# Patient Record
Sex: MALE | Race: WHITE | NOT HISPANIC OR LATINO | Employment: FULL TIME | URBAN - NONMETROPOLITAN AREA
[De-identification: names, ages, dates, MRNs, and addresses within clinical notes are randomized per-mention and may not be internally consistent; named-entity substitution may affect disease eponyms.]

---

## 2024-04-09 ENCOUNTER — HOSPITAL ENCOUNTER (EMERGENCY)
Facility: HOSPITAL | Age: 54
Discharge: HOME | End: 2024-04-09
Attending: EMERGENCY MEDICINE
Payer: COMMERCIAL

## 2024-04-09 ENCOUNTER — APPOINTMENT (OUTPATIENT)
Dept: RADIOLOGY | Facility: HOSPITAL | Age: 54
End: 2024-04-09
Payer: COMMERCIAL

## 2024-04-09 VITALS
WEIGHT: 210 LBS | HEIGHT: 71 IN | HEART RATE: 72 BPM | DIASTOLIC BLOOD PRESSURE: 85 MMHG | SYSTOLIC BLOOD PRESSURE: 117 MMHG | BODY MASS INDEX: 29.4 KG/M2 | TEMPERATURE: 98.1 F | RESPIRATION RATE: 18 BRPM | OXYGEN SATURATION: 96 %

## 2024-04-09 DIAGNOSIS — S93.402A SPRAIN OF LEFT ANKLE, INITIAL ENCOUNTER: Primary | ICD-10-CM

## 2024-04-09 DIAGNOSIS — S83.91XA SPRAIN OF RIGHT LOWER LEG, INITIAL ENCOUNTER: ICD-10-CM

## 2024-04-09 PROCEDURE — 73610 X-RAY EXAM OF ANKLE: CPT | Mod: RIGHT SIDE | Performed by: RADIOLOGY

## 2024-04-09 PROCEDURE — 2500000004 HC RX 250 GENERAL PHARMACY W/ HCPCS (ALT 636 FOR OP/ED): Performed by: EMERGENCY MEDICINE

## 2024-04-09 PROCEDURE — 73590 X-RAY EXAM OF LOWER LEG: CPT | Mod: RIGHT SIDE | Performed by: RADIOLOGY

## 2024-04-09 PROCEDURE — 73610 X-RAY EXAM OF ANKLE: CPT | Mod: RT

## 2024-04-09 PROCEDURE — 96372 THER/PROPH/DIAG INJ SC/IM: CPT | Performed by: EMERGENCY MEDICINE

## 2024-04-09 PROCEDURE — 99284 EMERGENCY DEPT VISIT MOD MDM: CPT | Performed by: EMERGENCY MEDICINE

## 2024-04-09 PROCEDURE — 73590 X-RAY EXAM OF LOWER LEG: CPT | Mod: RT

## 2024-04-09 RX ORDER — KETOROLAC TROMETHAMINE 30 MG/ML
60 INJECTION, SOLUTION INTRAMUSCULAR; INTRAVENOUS ONCE
Status: COMPLETED | OUTPATIENT
Start: 2024-04-09 | End: 2024-04-09

## 2024-04-09 RX ORDER — IBUPROFEN 600 MG/1
600 TABLET ORAL 3 TIMES DAILY
Qty: 30 TABLET | Refills: 0 | Status: SHIPPED | OUTPATIENT
Start: 2024-04-09

## 2024-04-09 RX ADMIN — KETOROLAC TROMETHAMINE 60 MG: 30 INJECTION, SOLUTION INTRAMUSCULAR at 10:29

## 2024-04-09 ASSESSMENT — COLUMBIA-SUICIDE SEVERITY RATING SCALE - C-SSRS
6. HAVE YOU EVER DONE ANYTHING, STARTED TO DO ANYTHING, OR PREPARED TO DO ANYTHING TO END YOUR LIFE?: NO
2. HAVE YOU ACTUALLY HAD ANY THOUGHTS OF KILLING YOURSELF?: NO
1. IN THE PAST MONTH, HAVE YOU WISHED YOU WERE DEAD OR WISHED YOU COULD GO TO SLEEP AND NOT WAKE UP?: NO

## 2024-04-09 ASSESSMENT — ENCOUNTER SYMPTOMS
WOUND: 0
NUMBNESS: 0
VOMITING: 0
MYALGIAS: 1
WEAKNESS: 0
CHILLS: 0
FEVER: 0
NAUSEA: 0
ARTHRALGIAS: 1

## 2024-04-09 ASSESSMENT — PAIN DESCRIPTION - PROGRESSION: CLINICAL_PROGRESSION: NOT CHANGED

## 2024-04-09 ASSESSMENT — PAIN DESCRIPTION - LOCATION
LOCATION: ANKLE
LOCATION: ANKLE

## 2024-04-09 ASSESSMENT — PAIN - FUNCTIONAL ASSESSMENT
PAIN_FUNCTIONAL_ASSESSMENT: 0-10
PAIN_FUNCTIONAL_ASSESSMENT: 0-10

## 2024-04-09 ASSESSMENT — PAIN DESCRIPTION - PAIN TYPE
TYPE: ACUTE PAIN
TYPE: ACUTE PAIN

## 2024-04-09 ASSESSMENT — PAIN SCALES - GENERAL
PAINLEVEL_OUTOF10: 7
PAINLEVEL_OUTOF10: 5 - MODERATE PAIN
PAINLEVEL_OUTOF10: 5 - MODERATE PAIN

## 2024-04-09 ASSESSMENT — PAIN DESCRIPTION - ORIENTATION
ORIENTATION: RIGHT
ORIENTATION: RIGHT

## 2024-04-09 NOTE — ED PROVIDER NOTES
Chief Complaint: RIGHT ANKLE SPRAIN    53-year-old male who works for the [employer] and apparently stepped off the edge of a curb and twisted his ankle came down with all his weight on it yesterday and today complains of pain in the right ankle with pain rating up to the right lateral leg he noticed some swelling he took some ibuprofen with some minimal relief but complains of pain with ambulation at this time           Review of Systems   Constitutional:  Negative for chills and fever.   Gastrointestinal:  Negative for nausea and vomiting.   Musculoskeletal:  Positive for arthralgias and myalgias.   Skin:  Negative for wound.   Neurological:  Negative for weakness and numbness.   All other systems reviewed and are negative.       Physical Exam  Constitutional:       General: He is not in acute distress.     Appearance: Normal appearance. He is not ill-appearing.   Musculoskeletal:      Right ankle: Swelling present. Tenderness present.        Legs:    Skin:     General: Skin is warm and dry.      Capillary Refill: Capillary refill takes less than 2 seconds.      Findings: No rash.   Neurological:      General: No focal deficit present.      Mental Status: He is alert and oriented to person, place, and time.   Psychiatric:         Mood and Affect: Mood normal.          Labs Reviewed - No data to display     XR ankle right 3+ views   Final Result   No acute osseous abnormalities.   Signed by Gurpreet Roa MD      XR tibia fibula right 2 views    (Results Pending)        Procedures     Medical Decision Making  Tristanian diagnose include ankle sprain ankle fracture.  X-rays were ordered of the right ankle and tib-fib at this time.  X-rays of the right ankle were negative according to radiologist x-rays of tib-fib as interpreted by myself emergency physician were negative patient was given Aircast crutches and ibuprofen and follow-up with PCP Ortho or occupational health         Diagnoses as of 04/09/24 1126    Sprain of left ankle, initial encounter   Sprain of right lower leg, initial encounter                    Esau Romero MD  04/09/24 8562

## 2024-04-10 ENCOUNTER — OFFICE VISIT (OUTPATIENT)
Dept: ORTHOPEDIC SURGERY | Facility: CLINIC | Age: 54
End: 2024-04-10
Payer: COMMERCIAL

## 2024-04-10 DIAGNOSIS — S99.911S RIGHT ANKLE INJURY, SEQUELA: ICD-10-CM

## 2024-04-10 DIAGNOSIS — S93.401A MODERATE RIGHT ANKLE SPRAIN, INITIAL ENCOUNTER: Primary | ICD-10-CM

## 2024-04-10 PROCEDURE — 99204 OFFICE O/P NEW MOD 45 MIN: CPT | Performed by: NURSE PRACTITIONER

## 2024-04-10 RX ORDER — MINERAL OIL
180 ENEMA (ML) RECTAL
COMMUNITY
Start: 2024-02-12

## 2024-04-10 RX ORDER — ROSUVASTATIN CALCIUM 5 MG/1
5 TABLET, COATED ORAL DAILY
COMMUNITY

## 2024-04-10 RX ORDER — MONTELUKAST SODIUM 10 MG/1
10 TABLET ORAL DAILY
COMMUNITY

## 2024-04-10 RX ORDER — TADALAFIL 5 MG/1
5 TABLET ORAL DAILY
COMMUNITY

## 2024-04-10 RX ORDER — OMEPRAZOLE 40 MG/1
40 CAPSULE, DELAYED RELEASE ORAL DAILY
COMMUNITY

## 2024-04-10 RX ORDER — LISINOPRIL 10 MG/1
10 TABLET ORAL DAILY
COMMUNITY

## 2024-04-10 ASSESSMENT — ENCOUNTER SYMPTOMS
NEUROLOGICAL NEGATIVE: 1
RESPIRATORY NEGATIVE: 1
PSYCHIATRIC NEGATIVE: 1
ARTHRALGIAS: 1
ENDOCRINE NEGATIVE: 1
HEMATOLOGIC/LYMPHATIC NEGATIVE: 1
CARDIOVASCULAR NEGATIVE: 1
CONSTITUTIONAL NEGATIVE: 1

## 2024-04-10 ASSESSMENT — PAIN SCALES - GENERAL: PAINLEVEL_OUTOF10: 6

## 2024-04-10 NOTE — PROGRESS NOTES
Subjective    Patient ID: Vero Bruner is a 53 y.o. male.    Chief Complaint: Ankle Sprain of the Right Knee (DATE OF INJURY 04/08/2024)    [facility] DOI: 4/8/24    CHRISTIANA Pham is a pleasant 53-year-old gentleman presenting today for initial evaluation of right ankle sprain from 4/8/24.  Patient was walking sideways on a job site, rolled his ankle inward on an incline, he was able to brace himself to prevent falling with a rake.  No prior injuries.  Patient stated pain and swelling shortly after.  Was seen in ED and given an Aircast, crutches and Motrin.  This seems to help.  Patient has been resting, elevating and using ice and IcyHot.    Review of Systems   Constitutional: Negative.    HENT: Negative.     Respiratory: Negative.     Cardiovascular: Negative.    Endocrine: Negative.    Musculoskeletal:  Positive for arthralgias.   Skin: Negative.    Neurological: Negative.    Hematological: Negative.    Psychiatric/Behavioral: Negative.         Objective   Right Ankle Exam     Tenderness   The patient is experiencing tenderness in the lateral malleolus.  Swelling: mild    Range of Motion   Dorsiflexion:  20   Plantar flexion:  10   Eversion:  10   Inversion:  10     Tests   Anterior drawer: negative  Varus tilt: negative    Other   Erythema: absent  Sensation: normal  Pulse: present     Comments:  Mild swelling to lateral ankle, no discoloration at this time.  Patient is tender over the anterior mid to distal shin region, mild swelling appreciated.  Full range of motion of knee with no symptom aggravation.  Patient able to wiggle toes with distal motor or sensory intact, cap refill at 2 to 3 seconds.  No pain with metatarsal squeeze test.            Image Results:  XR tibia fibula right 2 views  Narrative: STUDY:  Tibia and Fibula Radiographs; 4/9/2024 at 9:21 AM.  INDICATION:  Injury with pain.  COMPARISON:  None available.  ACCESSION NUMBER(S):  [id number]  ORDERING CLINICIAN:  ESTELA HUNT  TECHNIQUE:  Two view(s) of the  right tibia and fibula (four images).  FINDINGS:    There is no displaced fracture.  The alignment is anatomic.  No soft  tissue abnormality is seen.  Impression: No acute osseous abnormality.  Signed by Azam Gray MD  XR ankle right 3+ views  Narrative: STUDY:  Ankle Radiographs;  4/9/2024 at 9:21 AM.  INDICATION:  Injury, pain.  COMPARISON:  None available.  ACCESSION NUMBER(S):  [id number]  ORDERING CLINICIAN:  ESTELA HUNT  TECHNIQUE:  Three view(s) of the right ankle.  FINDINGS:    There is no displaced fracture.  There is osteophytic spurring on the  plantar aspect calcaneus.  No soft tissue abnormality is seen.  Impression: No acute osseous abnormalities.  Signed by Gurpreet Roa MD    Reviewed ED note on date of visit.    Assessment/Plan   Encounter Diagnoses:

## 2024-04-11 NOTE — ASSESSMENT & PLAN NOTE
Sx control with previously prescribed ibuprofen per directions, take with food, Tylenol prn.   Rest, ice, elevate and compression/brace with weight bearing activity, off with rest and sleep.    Recommended topical anti-inflammatory cream or menthol based product to the shin and ankle region per package directions  Patient fitted in a tall walking boot with good fit and support with distal neurovascular intact., may remove with rest and sleep.  May perform gentle range of motion as tolerated out of the boot.  Recommend boot and partial weightbearing of 50% over the next 3 to days, he may advance as tolerated with weightbearing status  Activity restriction recommended: [employer] completed for seated work, unable to operate machinery and foot controls  Follow up here 2 weeks with repeat imaging, sooner for changes or concerns.    This note was generated using Dragon software.  It may contain errors in wording, punctuation or spelling.

## 2024-04-24 ENCOUNTER — HOSPITAL ENCOUNTER (OUTPATIENT)
Dept: RADIOLOGY | Facility: CLINIC | Age: 54
Discharge: HOME | End: 2024-04-24
Payer: COMMERCIAL

## 2024-04-24 ENCOUNTER — OFFICE VISIT (OUTPATIENT)
Dept: ORTHOPEDIC SURGERY | Facility: CLINIC | Age: 54
End: 2024-04-24
Payer: COMMERCIAL

## 2024-04-24 DIAGNOSIS — S99.911S RIGHT ANKLE INJURY, SEQUELA: ICD-10-CM

## 2024-04-24 DIAGNOSIS — S93.401A MODERATE RIGHT ANKLE SPRAIN, INITIAL ENCOUNTER: Primary | ICD-10-CM

## 2024-04-24 PROCEDURE — 99213 OFFICE O/P EST LOW 20 MIN: CPT | Performed by: NURSE PRACTITIONER

## 2024-04-24 PROCEDURE — 73610 X-RAY EXAM OF ANKLE: CPT | Mod: RIGHT SIDE | Performed by: STUDENT IN AN ORGANIZED HEALTH CARE EDUCATION/TRAINING PROGRAM

## 2024-04-24 PROCEDURE — 73610 X-RAY EXAM OF ANKLE: CPT | Mod: RT

## 2024-04-24 PROCEDURE — L1902 AFO ANKLE GAUNTLET PRE OTS: HCPCS | Performed by: NURSE PRACTITIONER

## 2024-04-24 ASSESSMENT — ENCOUNTER SYMPTOMS
CONSTITUTIONAL NEGATIVE: 1
ARTHRALGIAS: 1
ENDOCRINE NEGATIVE: 1
CARDIOVASCULAR NEGATIVE: 1
HEMATOLOGIC/LYMPHATIC NEGATIVE: 1
PSYCHIATRIC NEGATIVE: 1
RESPIRATORY NEGATIVE: 1
NEUROLOGICAL NEGATIVE: 1

## 2024-04-24 ASSESSMENT — PAIN SCALES - GENERAL: PAINLEVEL_OUTOF10: 4

## 2024-04-24 ASSESSMENT — PAIN DESCRIPTION - DESCRIPTORS: DESCRIPTORS: ACHING;DULL

## 2024-04-24 ASSESSMENT — PAIN - FUNCTIONAL ASSESSMENT: PAIN_FUNCTIONAL_ASSESSMENT: 0-10

## 2024-04-24 NOTE — PROGRESS NOTES
Subjective    Patient ID: Vero Bruner is a 53 y.o. male.    Chief Complaint: Ankle Sprain and Follow-up of the Right Ankle    [facility] DOI: 4/8/24    Right Ankle       Vero is a pleasant 53-year-old gentleman presenting today for FUV of right ankle sprain from 4/8/24.  Patient was walking sideways on a job site, rolled his ankle inward on an incline, he was able to brace himself to prevent falling with a rake.  No prior injuries.    Patient has been working within the [employer] restrictions, he is not driving or operating any heavy equipment.  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  He states he stopped wearing the walking boot after approximately 1 week and transition back to the air stirrup which he feels has good support.  Overall improvement is ranked at 30 to 40% improved since date of injury.  Symptoms are aggravated with having the brace on, inversion motion and any plant and twist motions at the ankle.  He is using ibuprofen and as needed basis with good symptom control.  Patient remains tender to the anterior lateral aspect of the ankle with mild swelling.  Also notices this area is achy in the mornings and improves with range of motion and light activity.  Patient has been resting, elevating and using ice and IcyHot.  Patient would like to return to full duty ASAP, job duties include frequent in and out of heavy equipment, operating foot controls, walking and climbing type activities and uneven ground navigation.  He is also required to wear safety work boots which she is unable to wear currently with the bracing.    Review of Systems   Constitutional: Negative.    HENT: Negative.     Respiratory: Negative.     Cardiovascular: Negative.    Endocrine: Negative.    Musculoskeletal:  Positive for arthralgias.   Skin: Negative.    Neurological: Negative.    Hematological: Negative.    Psychiatric/Behavioral: Negative.         Objective   Right Ankle Exam     Tenderness   The patient is  experiencing tenderness in the lateral malleolus.  Swelling: mild    Range of Motion   Dorsiflexion:  10   Plantar flexion:  30   Eversion:  10   Inversion:  10     Tests   Anterior drawer: negative  Varus tilt: negative    Other   Erythema: absent  Sensation: normal  Pulse: present     Comments:  Mild swelling to lateral ankle and improved since last visit, no discoloration at this time.  Mild tenderness and inflammation over the ATFL remains.  Swelling and discomfort to anterior mid to distal shin has resolved, full range of motion of knee with no symptom aggravation.  Patient able to wiggle toes with distal motor or sensory intact, cap refill at 2 to 3 seconds.  No pain with metatarsal squeeze test.             Image Results:  XR tibia fibula right 2 views  Narrative: STUDY:  Tibia and Fibula Radiographs; 4/9/2024 at 9:21 AM.  INDICATION:  Injury with pain.  COMPARISON:  None available.  ACCESSION NUMBER(S):  [id number]  ORDERING CLINICIAN:  ESTELA HUNT  TECHNIQUE:  Two view(s) of the right tibia and fibula (four images).  FINDINGS:    There is no displaced fracture.  The alignment is anatomic.  No soft  tissue abnormality is seen.  Impression: No acute osseous abnormality.  Signed by Azam Gray MD  XR ankle right 3+ views  Narrative: STUDY:  Ankle Radiographs;  4/9/2024 at 9:21 AM.  INDICATION:  Injury, pain.  COMPARISON:  None available.  ACCESSION NUMBER(S):  [id number]  ORDERING CLINICIAN:  ESTELA HUNT  TECHNIQUE:  Three view(s) of the right ankle.  FINDINGS:    There is no displaced fracture.  There is osteophytic spurring on the  plantar aspect calcaneus.  No soft tissue abnormality is seen.  Impression: No acute osseous abnormalities.  Signed by Gurpreet Roa MD    Independent review of ankle x-rays compared to prior imaging from 4/9/2024 on date of visit.  There is no acute changes, no evidence of fracture or misalignment within the joint.  Await radiology report.    Assessment/Plan   Encounter  Diagnoses:  Problem List Items Addressed This Visit             ICD-10-CM    Moderate right ankle sprain - Primary S93.401A     Sx control with previously prescribed ibuprofen per directions, take with food, Tylenol prn.   Rest, ice, elevate and ankle speed brace with weight bearing activity, off with rest and sleep.    Continue topical anti-inflammatory cream or menthol based product to the shin and ankle region as needed per package directions  Activity restriction recommended: [employer] updated for some increase in weightbearing activities, continue no operating foot pedals with right foot.  C9 requested for PT 2 times weekly x 6 weeks for ankle fast rehab, may wean out of brace as tolerated, assess work readiness  Follow up here 3 weeks, sooner for changes or concerns.    This note was generated using Dragon software.  It may contain errors in wording, punctuation or spelling.         Relevant Orders    Follow Up In Orthopaedic Surgery     Other Visit Diagnoses         Codes    Right ankle injury, sequela     S99.911S    Relevant Orders    Referral to Physical Therapy

## 2024-05-08 ENCOUNTER — EVALUATION (OUTPATIENT)
Dept: PHYSICAL THERAPY | Facility: CLINIC | Age: 54
End: 2024-05-08
Payer: COMMERCIAL

## 2024-05-08 DIAGNOSIS — S93.401D SPRAIN OF LIGAMENT OF RIGHT ANKLE, SUBSEQUENT ENCOUNTER: Primary | ICD-10-CM

## 2024-05-08 PROBLEM — S93.401A SPRAIN OF LIGAMENT OF RIGHT ANKLE: Status: ACTIVE | Noted: 2024-05-08

## 2024-05-08 PROCEDURE — 97110 THERAPEUTIC EXERCISES: CPT | Mod: GP

## 2024-05-08 PROCEDURE — 97161 PT EVAL LOW COMPLEX 20 MIN: CPT | Mod: GP

## 2024-05-08 ASSESSMENT — ENCOUNTER SYMPTOMS
LOSS OF SENSATION IN FEET: 0
DEPRESSION: 0
OCCASIONAL FEELINGS OF UNSTEADINESS: 0

## 2024-05-08 ASSESSMENT — PATIENT HEALTH QUESTIONNAIRE - PHQ9
1. LITTLE INTEREST OR PLEASURE IN DOING THINGS: NOT AT ALL
SUM OF ALL RESPONSES TO PHQ9 QUESTIONS 1 AND 2: 0
2. FEELING DOWN, DEPRESSED OR HOPELESS: NOT AT ALL

## 2024-05-08 ASSESSMENT — PAIN - FUNCTIONAL ASSESSMENT: PAIN_FUNCTIONAL_ASSESSMENT: 0-10

## 2024-05-08 ASSESSMENT — PAIN SCALES - GENERAL: PAINLEVEL_OUTOF10: 0 - NO PAIN

## 2024-05-08 NOTE — PROGRESS NOTES
Physical Therapy    Physical Therapy Evaluation and Treatment      Patient Name: Vero Bruner  MRN: [id number]  Today's Date: 5/8/2024  Time Calculation  Start Time: 0815  Stop Time: 0853  Time Calculation (min): 38 min  Low Complex Evaluation: 22 minutes  Therapeutic Exercise: 15 minutes  Assessment:    Vero Bruner, a 53 y.o. male, arrives to outpatient PT s/p R ankle sprain. Pt presents with the following impairments: R ankle pain, deficits in painfree R ankle AROM, restriction of surrounding R ankle musculature, deficits in R ankle and R hip musculature strength, R SLS instability on level and compliant surface, and deficits in functional mobility per LEFS score. These impairments contribute to difficulty in activity limitations and participation restrictions including standing, ambulation on level and uneven surfaces, transfers, and completion of household/job duties. The pt will benefit from skilled PT services 1-2x/week for 6 weeks to address the above stated impairments and functional limitations to maximize participation and ease in household and social related activities. The pt has a good prognosis when considering positive factors including age and PLOF with barriers such as increased risk of reinjury of ankle due to sprain. Educated in resistive strengthening exercises in seated position as well as ankle dorsiflexion stretch. Some pain present with active INV/EV with cues to complete in painfree ROM. Also tactile cueing to prevent LE compensation with ankle eversion. HEP handout provided. 0/10 pain at end of session. The pt verbalized understanding and agreement to goals and POC. Thank you for this referral and please call [phone] with any questions or concerns.    Plan:   OP PT Plan  Treatment/Interventions: Cryotherapy, Education/ Instruction, Gait training, Manual therapy, Neuromuscular re-education, Therapeutic activities, Taping techniques, Therapeutic exercises, Other (comment), Vasopneumatic  device (IASTM/cupping)  PT Plan: Skilled PT  PT Frequency:  (1-2x)  Duration: 6 weeks for 12 total visits in POC  Onset Date: 04/08/24  Number of Treatments Authorized: 12  Rehab Potential: Good  Plan of Care Agreement: Patient      Current Problem:   1. 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 Patient will feel pain with initial weightbearing in standing on the lateral portion of the ankle. He has been wearing an ankle brace which initially helped but now finds as he is feeling better it is more restrictive and leads to swelling and pain of the R hip as he cannot move the ankle to accommodate his leg. He is currently on restrictions at work. He ends up loosening the brace which gives minimal relief. He does ice it and takes pain medication as needed. Imaging did clear him of fracture of the R ankle.    Medical History Questionnaire reviewed with patient  No barriers to care or learning    Precautions:   Precautions  STEADI Fall Risk Score (The score of 4 or more indicates an increased risk of falling): 2  Precautions Comment: Hx of HTN    Pain:   Pain Assessment  Pain Score: 0 - No pain  Pain Location: Ankle  Pain Orientation: Right    Home Living:   No concerns for home set-up  Prior Level of Function:   Independent in all ADLs/iADLs with no restriction; Works for [employer]    Objective   OBJECTIVE:    Lower Extremity Strength:  MMT 5/5 max  RIGHT LEFT   Hip Flexion 4 5   Hip Extension 4 5   Hip Abduction 4 5   Hip Adduction 4 5   Knee Extension 5 5   Knee Flexion 5 5   Ankle DF 2-    Ankle PF 4    Ankle INV 4    Ankle EV 4      Lower Extremity ROM: WNL unless documented below:  ROM  in Degrees  RIGHT LEFT   Ankle DF -20 deg    Ankle  deg    Ankle INV 27 deg pain    Ankle EV 3 deg pain      R SLS: x10 min ankle reactions  R SLS on airex: x10 seconds with mod sway with ankle reactions  Gait: No significant gait deviations  Palpation: Restriction of R achilles tendon, gastroc, hamstring, dorsum of R ankle    Outcome Measures:  Other Measures  Lower Extremity Funtional Score (LEFS): 51     Treatments:  Low Complex Evaluation  Therapeutic Exercise  -Resistive R ankle DF/PF/EV/INV 2x10 with green band each direction  -Long sitting R gastroc stretch with strap 3x20 second  EDUCATION:   Access Code: [email or web address]  URL: [email or web address]  Date: 05/8/2024  Prepared by: Isabell Johnson    Exercises  - Long Sitting Calf Stretch with Strap  - 1 x daily - 7 x weekly - 1 sets - 3 reps - 20-30 second hold  - Long Sitting Ankle Plantar Flexion with Resistance  - 1 x daily - 7 x weekly - 1-2 sets - 10 reps  - Long Sitting Ankle Eversion with Resistance  - 1 x daily - 7 x weekly - 1-2 sets - 10 reps  - Long Sitting Ankle Inversion with Resistance  - 1 x daily - 7 x weekly - 1-2 sets - 10 reps  - Long Sitting Ankle Dorsiflexion with Anchored Resistance  - 1 x daily - 7 x weekly - 1-2 sets - 10 reps    Goals:  Increase in LEFS score by 10 points to demonstrate improved functional mobility of B ankles for ease with standing and walking.-Week 6  Patient will demonstrate R SLS on compliant surface x20 seconds min ankle reaction to increase stability with working on uneven surfaces to prevent injury-Week 6  Improved gross R ankle and hip musculature strength 5/5 MMT to increase stability/support with standing and ambulation at home, community, and work.-Week 6  Decrease in max pain of R ankle  0/10 or less to demonstrate improved QOL-Week 6  Improved R ankle AROM DF: 10 deg and EV: 10 deg painfree for improved ease with completing job duties in standing/weightbearing-Week 6  Patient will  demonstrate compliance in their home exercise program in order to promote independence in self management of functional mobility.-Week 2

## 2024-05-10 ENCOUNTER — TREATMENT (OUTPATIENT)
Dept: PHYSICAL THERAPY | Facility: CLINIC | Age: 54
End: 2024-05-10
Payer: COMMERCIAL

## 2024-05-10 DIAGNOSIS — S93.401D SPRAIN OF LIGAMENT OF RIGHT ANKLE, SUBSEQUENT ENCOUNTER: ICD-10-CM

## 2024-05-10 PROCEDURE — 97110 THERAPEUTIC EXERCISES: CPT | Mod: GP

## 2024-05-10 ASSESSMENT — PAIN - FUNCTIONAL ASSESSMENT: PAIN_FUNCTIONAL_ASSESSMENT: 0-10

## 2024-05-10 ASSESSMENT — PAIN SCALES - GENERAL: PAINLEVEL_OUTOF10: 0 - NO PAIN

## 2024-05-10 NOTE — PROGRESS NOTES
Physical Therapy    Physical Therapy Treatment    Patient Name: Vero Bruner  MRN: [id number]  Today's Date: 5/10/2024  Time Calculation  Start Time: 0830  Stop Time: 0909  Time Calculation (min): 39 min  Therapeutic Exercise: 38 minutes, 3 units    Assessment:   All exercises performed with brace doffed. No increase in ankle pain with completion but noted fatigue especially with completion of resistive ankle exercises as well as BAPS board. Increased tension of R achilles tendon. Increased R ankle sway when in SLS on compliant surface.    Plan: Progress with R ankle strength/stability to prevent injury with eventual return to work duties.  OP PT Plan  Treatment/Interventions: Cryotherapy, Education/ Instruction, Gait training, Manual therapy, Neuromuscular re-education, Therapeutic activities, Taping techniques, Therapeutic exercises, Other (comment), Vasopneumatic device (IASTM/cupping)  PT Plan: Skilled PT  PT Frequency:  (1-2x)  Duration: 6 weeks for 12 total visits in POC  Onset Date: 04/08/24  Number of Treatments Authorized: 12  Rehab Potential: Good  Plan of Care Agreement: Patient    Current Problem  1. Sprain of ligament of right ankle, subsequent encounter  Follow Up In Physical Therapy          General     General  Reason for Referral: R ankle injury  Referred By: Acosta IEVY  General Comment: POC:2/12  Patient reports no current pain of R ankle. Feels that his figure 8 strap is limiting of his ankle and causes compensation of his R knee. His pain decreases when he takes the brace out.    Subjective    Precautions  Precautions  STEADI Fall Risk Score (The score of 4 or more indicates an increased risk of falling): 2  Precautions Comment: Hx of HTN  Pain  Pain Assessment  Pain Assessment: 0-10  Pain Score: 0 - No pain  Pain Location: Ankle  Pain Orientation: Right    Objective     Treatment  Therapeutic Exercise  Review of brace use  Recumbent Bicycle LEVEL 1.0 x5 min N  Heel/toe raises in standing 2x10  level surface N  Slantboard gastroc stretch 2x1 min N  Step ups 6 in R LE ascending 2x10 N  R ankle SLS x20 seconds N  BAPS LEVEL 2.0 DF/PF/L/R/CW/CCW x10 each direction N  R ankle PROM INV/EV with blue strap x10 each direction N  -Resistive R ankle DF/PF/EV/INV 2x10 with green band each direction  -Long sitting R gastroc stretch with strap 3x20 second    OP EDUCATION:       Goals:  Active       PT Problem       PT Goals       Start:  05/10/24    Expected End:  06/21/24       Increase in LEFS score by 10 points to demonstrate improved functional mobility of B ankles for ease with standing and walking.-Week 6  Patient will demonstrate R SLS on compliant surface x20 seconds min ankle reaction to increase stability with working on uneven surfaces to prevent injury-Week 6  Improved gross R ankle and hip musculature strength 5/5 MMT to increase stability/support with standing and ambulation at home, community, and work.-Week 6  Decrease in max pain of R ankle  0/10 or less to demonstrate improved QOL-Week 6  Improved R ankle AROM DF: 10 deg and EV: 10 deg painfree for improved ease with completing job duties in standing/weightbearing-Week 6  Patient will demonstrate compliance in their home exercise program in order to promote independence in self management of functional mobility.-Week 2

## 2024-05-15 ENCOUNTER — OFFICE VISIT (OUTPATIENT)
Dept: ORTHOPEDIC SURGERY | Facility: CLINIC | Age: 54
End: 2024-05-15
Payer: COMMERCIAL

## 2024-05-15 ENCOUNTER — HOSPITAL ENCOUNTER (OUTPATIENT)
Dept: RADIOLOGY | Facility: CLINIC | Age: 54
Discharge: HOME | End: 2024-05-15
Payer: COMMERCIAL

## 2024-05-15 ENCOUNTER — TREATMENT (OUTPATIENT)
Dept: PHYSICAL THERAPY | Facility: CLINIC | Age: 54
End: 2024-05-15
Payer: COMMERCIAL

## 2024-05-15 DIAGNOSIS — S93.401A MODERATE RIGHT ANKLE SPRAIN, INITIAL ENCOUNTER: Primary | ICD-10-CM

## 2024-05-15 DIAGNOSIS — S99.911S RIGHT ANKLE INJURY, SEQUELA: ICD-10-CM

## 2024-05-15 DIAGNOSIS — S93.401D SPRAIN OF LIGAMENT OF RIGHT ANKLE, SUBSEQUENT ENCOUNTER: ICD-10-CM

## 2024-05-15 PROCEDURE — 97110 THERAPEUTIC EXERCISES: CPT | Mod: GP,CQ

## 2024-05-15 PROCEDURE — 99213 OFFICE O/P EST LOW 20 MIN: CPT | Performed by: NURSE PRACTITIONER

## 2024-05-15 PROCEDURE — 73610 X-RAY EXAM OF ANKLE: CPT | Mod: RIGHT SIDE | Performed by: STUDENT IN AN ORGANIZED HEALTH CARE EDUCATION/TRAINING PROGRAM

## 2024-05-15 PROCEDURE — 73610 X-RAY EXAM OF ANKLE: CPT | Mod: RT

## 2024-05-15 ASSESSMENT — PAIN - FUNCTIONAL ASSESSMENT
PAIN_FUNCTIONAL_ASSESSMENT: 0-10
PAIN_FUNCTIONAL_ASSESSMENT: 0-10

## 2024-05-15 ASSESSMENT — ENCOUNTER SYMPTOMS
HEMATOLOGIC/LYMPHATIC NEGATIVE: 1
ENDOCRINE NEGATIVE: 1
RESPIRATORY NEGATIVE: 1
CARDIOVASCULAR NEGATIVE: 1
ARTHRALGIAS: 1
PSYCHIATRIC NEGATIVE: 1
CONSTITUTIONAL NEGATIVE: 1
NEUROLOGICAL NEGATIVE: 1

## 2024-05-15 ASSESSMENT — PAIN SCALES - GENERAL
PAINLEVEL_OUTOF10: 4
PAINLEVEL_OUTOF10: 3

## 2024-05-15 ASSESSMENT — PAIN DESCRIPTION - DESCRIPTORS: DESCRIPTORS: TIGHTNESS;DISCOMFORT;ACHING

## 2024-05-15 NOTE — PROGRESS NOTES
Subjective    Patient ID: Vero Bruner is a 53 y.o. male.    Chief Complaint: Follow-up and Worker's Compensation of the Right Ankle    [facility] DOI: 4/8/24    Right Ankle       Vero is a pleasant 53-year-old gentleman presenting today for FUV of right ankle sprain from 4/8/24.  Patient was walking sideways on a job site, rolled his ankle inward on an incline, he was able to brace himself to prevent falling with a rake.  No prior injuries.      Patient has been working within the [employer] restrictions, he is not driving or operating any heavy equipment.  Tolerating some weightbearing activity on ground level surfaces with no difficulties.  Overall improvement this date is approximately 60%.  Patient has started in PT and had 3 sessions.  He does believe he is beginning to strengthen.  Patient has purchased an OTC compression sleeve with support in the ankle that he wears with good tolerance.  PT did reach out last week regarding some knee pain with use of the figure 8 brace.  Resolution of knee pain at this time with changing braces for support.  Patient still gets some soreness with certain motions and activities.  Patient has been resting, elevating and using ice and IcyHot.  No new injuries    Review of Systems   Constitutional: Negative.    HENT: Negative.     Respiratory: Negative.     Cardiovascular: Negative.    Endocrine: Negative.    Musculoskeletal:  Positive for arthralgias.   Skin: Negative.    Neurological: Negative.    Hematological: Negative.    Psychiatric/Behavioral: Negative.         Objective   Right Ankle Exam     Tenderness   The patient is experiencing tenderness in the lateral malleolus.  Swelling: mild (Improved)    Range of Motion   Dorsiflexion:  10   Plantar flexion:  40   Eversion:  20   Inversion:  30     Tests   Anterior drawer: negative  Varus tilt: negative    Other   Erythema: absent  Sensation: normal  Pulse: present     Comments:  Mild swelling remains lateral ankle and improved since  last visit, no discoloration at this time.  Mild tenderness and inflammation over the ATFL remains.  Full ROM of distal joints with no sx aggravation  with distal motor or sensory intact, cap refill at 2 to 3 seconds.  No pain with metatarsal squeeze test.  No ankle instability on exam.            Image Results:  XR ankle right 3+ views  Narrative: Interpreted By:  Jason Brady,   STUDY:  XR ANKLE RIGHT 3+ VIEWS; ;  4/24/2024 8:52 am      INDICATION:  Signs/Symptoms:POST INJURY.      COMPARISON:  04/09/2024      ACCESSION NUMBER(S):  [id number]      ORDERING CLINICIAN:  ARIANNE CROWLEY      FINDINGS:  Three views of the right ankle.      No acute fracture or dislocation. Plantar calcaneal enthesophyte. The  soft tissues are unremarkable.      Impression: No acute fracture or dislocation.      MACRO:  None      Signed by: Jason Brady 4/25/2024 5:44 PM  Dictation workstation:   [id number]    Independent review of ankle x-rays compared to prior imaging on date of visit.  There is no acute changes, no evidence of fracture or misalignment within the joint.  Await radiology report.  .  Reviewed most recent PT note on date of visit.  Sending message to PT to instruct on K taping technique.     Assessment/Plan   Encounter Diagnoses:  Problem List Items Addressed This Visit             ICD-10-CM    Moderate right ankle sprain - Primary S93.401A     Sx control with previously prescribed ibuprofen per directions, take with food, Tylenol prn.   Rest, ice, elevate and speed brace, compression brace or K taping with weight bearing activity, off with rest and sleep.    Continue topical anti-inflammatory cream or menthol based product to the shin and ankle region as needed per package directions  Activity restriction recommended: [employer] updated  increase in weightbearing activities as tolerated, continue no operating foot pedals with right foot.  C9 requested for PT 2 times weekly x 6 weeks for ankle fast rehab, may wean out  of brace as tolerated, assess work readiness  Follow up here 2 weeks, sooner for changes or concerns.  Anticipating increasing work ability depending on next exam.  This note was generated using Dragon software.  It may contain errors in wording, punctuation or spelling.          Other Visit Diagnoses         Codes    Right ankle injury, sequela     S99.911S    Relevant Orders    XR ankle right 3+ views

## 2024-05-15 NOTE — PROGRESS NOTES
Physical Therapy Treatment    Patient Name: Vero Bruner  MRN: [id number]  Today's Date: 5/15/2024  Time Calculation  Start Time: 0705  Stop Time: 0745  Time Calculation (min): 40 min  PT Therapeutic Procedures Time Entry  Therapeutic Exercise Time Entry: 38       Assessment:   Patient identified by name and date of birth. Patient demonstrated fair balance with SLS with intermittent finger touch down and minimal sway noted. All exercises preformed with use of soft brace this date.     OBJECTIVE    degrees   Plan:  OP PT Plan  Treatment/Interventions: Cryotherapy, Education/ Instruction, Gait training, Manual therapy, Neuromuscular re-education, Therapeutic activities, Taping techniques, Therapeutic exercises, Other (comment), Vasopneumatic device (IASTM/cupping)  PT Plan: Skilled PT  PT Frequency:  (1-2x)  Duration: 6 weeks for 12 total visits in POC  Onset Date: 04/08/24  Number of Treatments Authorized: 12  Rehab Potential: Good  Plan of Care Agreement: Patient  Continue with progression of ankle ROM and strengthening for increased stability and ease with ambulation for job duties.   Current Problem  Problem List Items Addressed This Visit             ICD-10-CM    Sprain of ligament of right ankle S93.401A       Subjective Patient reported compliance with % of the time and verbalized understanding.  Patient reported 6/10 achiness/discomfort/after treatment.  General  Reason for Referral: R ankle injury  Referred By: Acosta IVEY  General Comment: POC:3/12  Precautions  Precautions  Precautions Comment: Hx of HTN; can wean out of brace as tolerated    Pain  Pain Assessment: 0-10  Pain Score: 3  Pain Location: Ankle  Pain Orientation: Right     Treatments:  Therapeutic Exercise  Therapeutic Exercise Performed: Yes   Review of brace use  Recumbent Bicycle LEVEL 1.0 x5 min   Slantboard gastroc stretch 2x1 min   Heel/toe raises in standing 2x10 level surface   Step ups 6 in R LE ascending 2x10   R ankle SLS  "3x20 seconds   Tandem stance 2 x 30\" ea direction (N)  BAPS LEVEL 2.0 DF/PF/L/R/CW/CCW x20 each direction (P)  R ankle PROM INV/EV with blue strap x10 each direction    -Resistive R ankle DF/PF/EV/INV 2x10 with green band each direction  -Long sitting R gastroc stretch with strap 3x20 second    OP EDUCATION:   Access Code: [email or web address]  URL: [email or web address]  Date: 05/8/2024  Prepared by: Isabell Johnson     Exercises  - Long Sitting Calf Stretch with Strap  - 1 x daily - 7 x weekly - 1 sets - 3 reps - 20-30 second hold  - Long Sitting Ankle Plantar Flexion with Resistance  - 1 x daily - 7 x weekly - 1-2 sets - 10 reps  - Long Sitting Ankle Eversion with Resistance  - 1 x daily - 7 x weekly - 1-2 sets - 10 reps  - Long Sitting Ankle Inversion with Resistance  - 1 x daily - 7 x weekly - 1-2 sets - 10 reps  - Long Sitting Ankle Dorsiflexion with Anchored Resistance  - 1 x daily - 7 x weekly - 1-2 sets - 10 reps       Goals:  Active       PT Problem       PT Goals       Start:  05/10/24    Expected End:  06/21/24       Increase in LEFS score by 10 points to demonstrate improved functional mobility of B ankles for ease with standing and walking.-Week 6  Patient will demonstrate R SLS on compliant surface x20 seconds min ankle reaction to increase stability with working on uneven surfaces to prevent injury-Week 6  Improved gross R ankle and hip musculature strength 5/5 MMT to increase stability/support with standing and ambulation at home, community, and work.-Week 6  Decrease in max pain of R ankle  0/10 or less to demonstrate improved QOL-Week 6  Improved R ankle AROM DF: 10 deg and EV: 10 deg painfree for improved ease with completing job duties in standing/weightbearing-Week 6  Patient will demonstrate compliance in their home exercise program in order to promote independence in self management of functional mobility.-Week 2            "

## 2024-05-15 NOTE — ASSESSMENT & PLAN NOTE
Sx control with previously prescribed ibuprofen per directions, take with food, Tylenol prn.   Rest, ice, elevate and speed brace, compression brace or K taping with weight bearing activity, off with rest and sleep.    Continue topical anti-inflammatory cream or menthol based product to the shin and ankle region as needed per package directions  Activity restriction recommended: [employer] updated  increase in weightbearing activities as tolerated, continue no operating foot pedals with right foot.  C9 requested for PT 2 times weekly x 6 weeks for ankle fast rehab, may wean out of brace as tolerated, assess work readiness  Follow up here 2 weeks, sooner for changes or concerns.  Anticipating increasing work ability depending on next exam.  This note was generated using Dragon software.  It may contain errors in wording, punctuation or spelling.

## 2024-05-17 ENCOUNTER — TREATMENT (OUTPATIENT)
Dept: PHYSICAL THERAPY | Facility: CLINIC | Age: 54
End: 2024-05-17
Payer: COMMERCIAL

## 2024-05-17 DIAGNOSIS — S93.401D SPRAIN OF LIGAMENT OF RIGHT ANKLE, SUBSEQUENT ENCOUNTER: ICD-10-CM

## 2024-05-17 PROCEDURE — 97110 THERAPEUTIC EXERCISES: CPT | Mod: GP,CQ

## 2024-05-17 ASSESSMENT — PAIN SCALES - GENERAL: PAINLEVEL_OUTOF10: 3

## 2024-05-17 ASSESSMENT — PAIN - FUNCTIONAL ASSESSMENT: PAIN_FUNCTIONAL_ASSESSMENT: 0-10

## 2024-05-17 NOTE — PROGRESS NOTES
Physical Therapy Treatment    Patient Name: Vero Bruner  MRN: [id number]  Today's Date: 5/17/2024  Time Calculation  Start Time: 0830  Stop Time: 0917  Time Calculation (min): 47 min  PT Therapeutic Procedures Time Entry  Therapeutic Exercise Time Entry: 43       Assessment:   Patient appropriately challenged. Patient tolerates session with mild difficulty. Patient demo's slight increased symptoms after 4 minutes of R ankle traction in anterior portion of ankle joint. Patient able to tolerate progressions of resistance with no c/o increased symptoms. Demo's fatigue in R ankle pre and post session.     Plan:  Continue to work on improving strength and ROM in R ankle to be able to return to normal work duties with little to no difficulty. -AB.     OP PT Plan  Treatment/Interventions: Cryotherapy, Education/ Instruction, Gait training, Manual therapy, Neuromuscular re-education, Therapeutic activities, Taping techniques, Therapeutic exercises, Other (comment), Vasopneumatic device (IASTM/cupping)  PT Plan: Skilled PT  PT Frequency:  (1-2x)  Duration: 6 weeks for 12 total visits in POC  Onset Date: 04/08/24  Number of Treatments Authorized: 12  Rehab Potential: Good  Plan of Care Agreement: Patient    Current Problem  Problem List Items Addressed This Visit             ICD-10-CM    Sprain of ligament of right ankle S93.401D       Subjective   General  Reason for Referral: R ankle injury  Referred By: Acosta LILLY-TONJA  General Comment: POC:4/12  HEP: Yes  Patient states that he feels like it is getting better.     Precautions  Precautions  Precautions Comment: Hx of HTN; can wean out of brace as tolerated    Pain  Pain Assessment: 0-10  Pain Score: 3  Pain Location: Ankle  Pain Orientation: Right    Objective     Treatments:  Therapeutic Exercise: 43 minutes, 3 units   Review of brace use  Recumbent Bicycle LEVEL 1.0 x5 min   Slantboard gastroc stretch 2x1 min   Heel/toe raises in standing 2x10 level surface   Step ups 6 in  "R LE ascending 2x10 Fwd/Lateral (P, motion)   R ankle SLS 3x30 seconds (P, time)   Tandem stance 3 x 30\" ea direction (P, reps)   Ankle traction x5' PF/DF/CW/CCW R (N)  BAPS LEVEL 2.0 DF/PF/L/R/CW/CCW x20 each direction (X, not today)  R ankle PROM INV/EV with blue strap x10 each direction    -Resistive R ankle DF/PF/EV/INV 2x10 with Black band each direction (P, resistance)  -Long sitting R gastroc stretch with strap 3x20 second    OP EDUCATION:   Access Code: [email or web address]  URL: [email or web address]  Date: 05/8/2024  Prepared by: Isabell Johnson     Exercises  - Long Sitting Calf Stretch with Strap  - 1 x daily - 7 x weekly - 1 sets - 3 reps - 20-30 second hold  - Long Sitting Ankle Plantar Flexion with Resistance  - 1 x daily - 7 x weekly - 1-2 sets - 10 reps  - Long Sitting Ankle Eversion with Resistance  - 1 x daily - 7 x weekly - 1-2 sets - 10 reps  - Long Sitting Ankle Inversion with Resistance  - 1 x daily - 7 x weekly - 1-2 sets - 10 reps  - Long Sitting Ankle Dorsiflexion with Anchored Resistance  - 1 x daily - 7 x weekly - 1-2 sets - 10 reps    Goals:  Active       PT Problem       PT Goals       Start:  05/10/24    Expected End:  06/21/24       Increase in LEFS score by 10 points to demonstrate improved functional mobility of B ankles for ease with standing and walking.-Week 6  Patient will demonstrate R SLS on compliant surface x20 seconds min ankle reaction to increase stability with working on uneven surfaces to prevent injury-Week 6  Improved gross R ankle and hip musculature strength 5/5 MMT to increase stability/support with standing and ambulation at home, community, and work.-Week 6  Decrease in max pain of R ankle  0/10 or less to demonstrate improved QOL-Week 6  Improved R ankle AROM DF: 10 deg and EV: 10 deg painfree for improved ease with completing job duties in standing/weightbearing-Week 6  Patient will demonstrate compliance in their home exercise program in order to " promote independence in self management of functional mobility.-Week 2

## 2024-05-20 ENCOUNTER — TREATMENT (OUTPATIENT)
Dept: PHYSICAL THERAPY | Facility: CLINIC | Age: 54
End: 2024-05-20
Payer: COMMERCIAL

## 2024-05-20 DIAGNOSIS — S93.401D SPRAIN OF LIGAMENT OF RIGHT ANKLE, SUBSEQUENT ENCOUNTER: ICD-10-CM

## 2024-05-20 PROCEDURE — 97110 THERAPEUTIC EXERCISES: CPT | Mod: GP,CQ

## 2024-05-20 PROCEDURE — 97140 MANUAL THERAPY 1/> REGIONS: CPT | Mod: GP,CQ

## 2024-05-20 ASSESSMENT — PAIN - FUNCTIONAL ASSESSMENT: PAIN_FUNCTIONAL_ASSESSMENT: 0-10

## 2024-05-20 ASSESSMENT — PAIN SCALES - GENERAL: PAINLEVEL_OUTOF10: 4

## 2024-05-20 NOTE — PROGRESS NOTES
"Physical Therapy Treatment    Patient Name: Vero Bruner  MRN: [id number]  Today's Date: 5/20/2024  Time Calculation  Start Time: 0700  Stop Time: 0745  Time Calculation (min): 45 min  PT Therapeutic Procedures Time Entry  Manual Therapy Time Entry: 9  Therapeutic Exercise Time Entry: 34       Assessment:   Patient identified by name and date of birth. Patient was able to demonstrated good tolerance to treatment and progress with level of BAPS board this date. He presented with muscle tension/adhesions in plantar or foot.     OBJECTIVE   Patient was able to maintain balance with moderate sway for 20\"   Plan:  OP PT Plan  Treatment/Interventions: Cryotherapy, Education/ Instruction, Gait training, Manual therapy, Neuromuscular re-education, Therapeutic activities, Taping techniques, Therapeutic exercises, Other (comment), Vasopneumatic device (IASTM/cupping)  PT Plan: Skilled PT  PT Frequency:  (1-2x)  Duration: 6 weeks for 12 total visits in POC  Onset Date: 04/08/24  Number of Treatments Authorized: 12  Rehab Potential: Good  Plan of Care Agreement: Patient  Continue with progression of ankle strengthening and ROM  for increased ambulation.   Current Problem  Problem List Items Addressed This Visit             ICD-10-CM    Sprain of ligament of right ankle S93.401D       Subjective Patient reported compliance with % of the time and verbalized understanding.  Patient reported increased soreness after treatment that increased the next morning. He reported 3-4/10 pain after treatment.  General  Reason for Referral: R ankle injury  Referred By: Acosta IVEY  General Comment: POC:5/12  Precautions  Precautions  Precautions Comment: Hx of HTN; can wean out of brace as tolerated    Pain  Pain Assessment: 0-10  Pain Score: 4  Pain Location: Ankle  Pain Orientation: Right     Treatments:  Therapeutic Exercise  Therapeutic Exercise Performed: Yes   Review of brace use  Recumbent Bicycle LEVEL 1.0 x5 min   Slantboard " "gastroc stretch 2x1 min   Heel/toe raises in standing 2x10 level surface   Step ups 6 in R LE ascending 2x10 Fwd/Lateral (P, motion)   R ankle SLS 3x30 seconds (P, time)   Tandem stance 3 x 30\" ea direction (P, reps)   Ankle traction x5' PF/DF/CW/CCW R (N)  BAPS LEVEL 3.0 DF/PF/L/R/CW/CCW x20 each direction (P)  R ankle PROM INV/EV with blue strap x10 each direction    Resistive R ankle DF/PF/EV/INV 2x10 with Black band each direction (P, resistance)  Long sitting R gastroc stretch with strap 3x20 second    Manual;  PROM completed, STW to plantar of foot (N)     OP EDUCATION:   Access Code: [email or web address]  URL: [email or web address]  Date: 05/8/2024  Prepared by: Isabell oJhnson     Exercises  - Long Sitting Calf Stretch with Strap  - 1 x daily - 7 x weekly - 1 sets - 3 reps - 20-30 second hold  - Long Sitting Ankle Plantar Flexion with Resistance  - 1 x daily - 7 x weekly - 1-2 sets - 10 reps  - Long Sitting Ankle Eversion with Resistance  - 1 x daily - 7 x weekly - 1-2 sets - 10 reps  - Long Sitting Ankle Inversion with Resistance  - 1 x daily - 7 x weekly - 1-2 sets - 10 reps  - Long Sitting Ankle Dorsiflexion with Anchored Resistance  - 1 x daily - 7 x weekly - 1-2 sets - 10 reps  Goals:  Active       PT Problem       PT Goals       Start:  05/10/24    Expected End:  06/21/24       Increase in LEFS score by 10 points to demonstrate improved functional mobility of B ankles for ease with standing and walking.-Week 6  Patient will demonstrate R SLS on compliant surface x20 seconds min ankle reaction to increase stability with working on uneven surfaces to prevent injury-Week 6  Improved gross R ankle and hip musculature strength 5/5 MMT to increase stability/support with standing and ambulation at home, community, and work.-Week 6  Decrease in max pain of R ankle  0/10 or less to demonstrate improved QOL-Week 6  Improved R ankle AROM DF: 10 deg and EV: 10 deg painfree for improved ease with " completing job duties in standing/weightbearing-Week 6  Patient will demonstrate compliance in their home exercise program in order to promote independence in self management of functional mobility.-Week 2

## 2024-05-24 ENCOUNTER — TREATMENT (OUTPATIENT)
Dept: PHYSICAL THERAPY | Facility: CLINIC | Age: 54
End: 2024-05-24
Payer: COMMERCIAL

## 2024-05-24 DIAGNOSIS — S93.401D SPRAIN OF LIGAMENT OF RIGHT ANKLE, SUBSEQUENT ENCOUNTER: ICD-10-CM

## 2024-05-24 PROCEDURE — 97110 THERAPEUTIC EXERCISES: CPT | Mod: GP,CQ

## 2024-05-24 ASSESSMENT — PAIN SCALES - GENERAL: PAINLEVEL_OUTOF10: 0 - NO PAIN

## 2024-05-24 ASSESSMENT — PAIN - FUNCTIONAL ASSESSMENT: PAIN_FUNCTIONAL_ASSESSMENT: 0-10

## 2024-05-24 NOTE — PROGRESS NOTES
"Physical Therapy Treatment    Patient Name: Vero Bruner  MRN: [id number]  Today's Date: 5/24/2024  Time Calculation  Start Time: 0830  Stop Time: 0915  Time Calculation (min): 45 min  PT Therapeutic Procedures Time Entry  Therapeutic Exercise Time Entry: 43       Assessment:   Patient able to tolerate progressions of proprioception with intermittent fingertip touching noted. Patient tolerates PRE's with no c/o increased symptoms. Demo's fatigue at end of session.     Plan:  Continue to work on improving strength and ROM to be able to return to normal work duties with little to no challenges. -AB.     OP PT Plan  Treatment/Interventions: Cryotherapy, Education/ Instruction, Gait training, Manual therapy, Neuromuscular re-education, Therapeutic activities, Taping techniques, Therapeutic exercises, Other (comment), Vasopneumatic device (IASTM/cupping)  PT Plan: Skilled PT  PT Frequency:  (1-2x)  Duration: 6 weeks for 12 total visits in POC  Onset Date: 04/08/24  Number of Treatments Authorized: 12  Rehab Potential: Good  Plan of Care Agreement: Patient    Current Problem  Problem List Items Addressed This Visit             ICD-10-CM    Sprain of ligament of right ankle S93.401D       Subjective   General  Reason for Referral: R ankle injury  Referred By: Acosta LILLY-TONJA  General Comment: POC:6/12  HEP: Yes      Precautions  Precautions  Precautions Comment: Hx of HTN; can wean out of brace as tolerated    Pain  Pain Assessment: 0-10  Pain Score: 0 - No pain  Pain Location: Ankle  Pain Orientation: Right    Objective     Treatments:  Therapeutic Exercise: 43 minutes, 3 units  Review of brace use  Recumbent Bicycle LEVEL 1.0 x5 min   Slantboard gastroc stretch 2x1 min   Heel/toe raises in standing 2x10 level surface   Step ups 6 in R LE ascending 2x10 Fwd/Lateral (P, motion)   R ankle SLS 3x30 seconds on airex (P, surface)   Tandem stance 3 x 30\" ea direction on airex (P, surface)   Ankle traction x5' PF/DF/CW/CCW R (X) "   BAPS LEVEL 3.0 DF/PF/L/R/CW/CCW x20 each direction   R ankle PROM INV/EV with blue strap x10 each direction    Resistive R ankle DF/PF/EV/INV 2x10 with Black band each direction (P, resistance)  Long sitting R gastroc stretch with strap 3x20 second    Manual;  PROM completed, STW to plantar of foot (N)    OP EDUCATION:   Access Code: [email or web address]  URL: [email or web address]  Date: 05/8/2024  Prepared by: Isabell Johnson     Exercises  - Long Sitting Calf Stretch with Strap  - 1 x daily - 7 x weekly - 1 sets - 3 reps - 20-30 second hold  - Long Sitting Ankle Plantar Flexion with Resistance  - 1 x daily - 7 x weekly - 1-2 sets - 10 reps  - Long Sitting Ankle Eversion with Resistance  - 1 x daily - 7 x weekly - 1-2 sets - 10 reps  - Long Sitting Ankle Inversion with Resistance  - 1 x daily - 7 x weekly - 1-2 sets - 10 reps  - Long Sitting Ankle Dorsiflexion with Anchored Resistance  - 1 x daily - 7 x weekly - 1-2 sets - 10 reps    Goals:  Active       PT Problem       PT Goals       Start:  05/10/24    Expected End:  06/21/24       Increase in LEFS score by 10 points to demonstrate improved functional mobility of B ankles for ease with standing and walking.-Week 6  Patient will demonstrate R SLS on compliant surface x20 seconds min ankle reaction to increase stability with working on uneven surfaces to prevent injury-Week 6  Improved gross R ankle and hip musculature strength 5/5 MMT to increase stability/support with standing and ambulation at home, community, and work.-Week 6  Decrease in max pain of R ankle  0/10 or less to demonstrate improved QOL-Week 6  Improved R ankle AROM DF: 10 deg and EV: 10 deg painfree for improved ease with completing job duties in standing/weightbearing-Week 6  Patient will demonstrate compliance in their home exercise program in order to promote independence in self management of functional mobility.-Week 2

## 2024-05-29 ENCOUNTER — OFFICE VISIT (OUTPATIENT)
Dept: ORTHOPEDIC SURGERY | Facility: CLINIC | Age: 54
End: 2024-05-29
Payer: COMMERCIAL

## 2024-05-29 ENCOUNTER — TREATMENT (OUTPATIENT)
Dept: PHYSICAL THERAPY | Facility: CLINIC | Age: 54
End: 2024-05-29
Payer: COMMERCIAL

## 2024-05-29 DIAGNOSIS — S99.911S RIGHT ANKLE INJURY, SEQUELA: ICD-10-CM

## 2024-05-29 DIAGNOSIS — S93.401D SPRAIN OF LIGAMENT OF RIGHT ANKLE, SUBSEQUENT ENCOUNTER: ICD-10-CM

## 2024-05-29 DIAGNOSIS — S93.401A MODERATE RIGHT ANKLE SPRAIN, INITIAL ENCOUNTER: Primary | ICD-10-CM

## 2024-05-29 PROCEDURE — 97110 THERAPEUTIC EXERCISES: CPT | Mod: GP,CQ

## 2024-05-29 PROCEDURE — 99213 OFFICE O/P EST LOW 20 MIN: CPT | Performed by: NURSE PRACTITIONER

## 2024-05-29 ASSESSMENT — PAIN SCALES - GENERAL
PAINLEVEL_OUTOF10: 1
PAINLEVEL_OUTOF10: 3

## 2024-05-29 ASSESSMENT — ENCOUNTER SYMPTOMS
PSYCHIATRIC NEGATIVE: 1
ENDOCRINE NEGATIVE: 1
CARDIOVASCULAR NEGATIVE: 1
RESPIRATORY NEGATIVE: 1
NEUROLOGICAL NEGATIVE: 1
ARTHRALGIAS: 1
CONSTITUTIONAL NEGATIVE: 1
HEMATOLOGIC/LYMPHATIC NEGATIVE: 1

## 2024-05-29 ASSESSMENT — PAIN - FUNCTIONAL ASSESSMENT
PAIN_FUNCTIONAL_ASSESSMENT: 0-10
PAIN_FUNCTIONAL_ASSESSMENT: 0-10

## 2024-05-29 ASSESSMENT — PAIN DESCRIPTION - DESCRIPTORS: DESCRIPTORS: ACHING

## 2024-05-29 NOTE — ASSESSMENT & PLAN NOTE
Sx control with  ibuprofen per package directions on as needed basis, take with food, Tylenol prn.   Continue with compression sleeve on as needed basis  Continue with PT as scheduled  [employer] updated with return to work full duty as tolerated beginning next shift  Plan to follow-up here after completion of PT, sooner for changes or concerns  Patient in agreement with plan of care   Continue topical anti-inflammatory cream or menthol based product to the shin and ankle region as needed per package directions    This note was generated using Dragon software.  It may contain errors in wording, punctuation or spelling.

## 2024-05-29 NOTE — PROGRESS NOTES
Subjective    Patient ID: Vero Bruner is a 53 y.o. male.    Chief Complaint: Follow-up and Worker's Compensation of the Right Ankle (Patient states he wears the brace only while at work.  He states he has just occasional pain, very minimal)    [facility] DOI: 4/8/24    Right Ankle       Vero is a pleasant 53-year-old gentleman presenting today for FUV of right ankle sprain from 4/8/24.  Patient was walking sideways on a job site, rolled his ankle inward on an incline, he was able to brace himself to prevent falling with a rake.  No prior injuries.      Patient has been working within the [employer] restrictions, he is not driving or operating any heavy equipment.  Tolerating some weightbearing activity on ground level surfaces with no difficulties.  Overall improvement this date is approximately 60%.  Patient has started in PT and had 3 sessions.  He does believe he is beginning to strengthen.  Patient has purchased an OTC compression sleeve with support in the ankle that he wears with good tolerance.  PT did reach out last week regarding some knee pain with use of the figure 8 brace.  Resolution of knee pain at this time with changing braces for support.  Patient still gets some soreness with certain motions and activities.  Patient has been resting, elevating and using ice and IcyHot.  No new injuries    HEP at least once daily   PT - 4 sessions remaining  Feels ready to return full duty  [employer]   Someitmes sore in mornings, lossens with activity    Review of Systems   Constitutional: Negative.    HENT: Negative.     Respiratory: Negative.     Cardiovascular: Negative.    Endocrine: Negative.    Musculoskeletal:  Positive for arthralgias.   Skin: Negative.    Neurological: Negative.    Hematological: Negative.    Psychiatric/Behavioral: Negative.         Objective   Right Ankle Exam     Tenderness   The patient is experiencing tenderness in the lateral malleolus.  Swelling: mild (Improved)    Range of Motion    Dorsiflexion:  10   Plantar flexion:  40   Eversion:  20   Inversion:  30     Tests   Anterior drawer: negative  Varus tilt: negative    Other   Erythema: absent  Sensation: normal  Pulse: present     Comments:  Mild swelling remains lateral ankle and improved since last visit, no discoloration at this time.  Mild tenderness and inflammation over the ATFL remains.  Full ROM of distal joints with no sx aggravation  with distal motor or sensory intact, cap refill at 2 to 3 seconds.  No pain with metatarsal squeeze test.  No ankle instability on exam.            Image Results:  XR ankle right 3+ views  Narrative: Interpreted By:  Jason Brady,   STUDY:  XR ANKLE RIGHT 3+ VIEWS; ;  5/15/2024 8:34 am      INDICATION:  Signs/Symptoms:S/P INJURY, PAIN.      COMPARISON:  04/24/2024      ACCESSION NUMBER(S):  [id number]      ORDERING CLINICIAN:  ARIANNE CROWLEY      FINDINGS:  Three views of the right ankle.      No acute fracture. No dislocation. Mild medial malleolar spurring.  The soft tissues are unremarkable.      Impression: No acute fracture or dislocation.      MACRO:  None      Signed by: Jason Brady 5/17/2024 5:50 PM  Dictation workstation:   [id number]    Independent review of ankle x-rays compared to prior imaging on date of visit.  There is no acute changes, no evidence of fracture or misalignment within the joint.  Await radiology report.  .  Reviewed most recent PT note on date of visit, patient advancing well    Assessment/Plan   Encounter Diagnoses:  Problem List Items Addressed This Visit             ICD-10-CM    Moderate right ankle sprain S93.401A     Sx control with  ibuprofen per package directions on as needed basis, take with food, Tylenol prn.   Continue with compression sleeve on as needed basis  Continue with PT as scheduled  [employer] updated with return to work full duty as tolerated beginning next shift  Plan to follow-up here after completion of PT, sooner for changes or concerns  Patient  in agreement with plan of care   Continue topical anti-inflammatory cream or menthol based product to the shin and ankle region as needed per package directions    This note was generated using Dragon software.  It may contain errors in wording, punctuation or spelling.          Other Visit Diagnoses         Codes    Right ankle injury, sequela    -  Primary S99.911S

## 2024-05-29 NOTE — PROGRESS NOTES
Physical Therapy Treatment    Patient Name: Vero Bruner  MRN: [id number]  Today's Date: 5/29/2024  Time Calculation  Start Time: 0815  Stop Time: 0856  Time Calculation (min): 41 min  PT Therapeutic Procedures Time Entry  Therapeutic Exercise Time Entry: 39       Assessment:   Patient able to tolerate new PRE's with no c/o increased symptoms. Patient demo's challenges with standing balance exercises with work boots. Patient requires intermittent fingertip touching with standing balance board.     Plan:  Continue to work on improving strength and ROM to be able to perform normal work duties with little to no challenges. -AB.     OP PT Plan  Treatment/Interventions: Cryotherapy, Education/ Instruction, Gait training, Manual therapy, Neuromuscular re-education, Therapeutic activities, Taping techniques, Therapeutic exercises, Other (comment), Vasopneumatic device (IASTM/cupping)  PT Plan: Skilled PT  PT Frequency:  (1-2x)  Duration: 6 weeks for 12 total visits in POC  Onset Date: 04/08/24  Number of Treatments Authorized: 12  Rehab Potential: Good  Plan of Care Agreement: Patient    Current Problem  Problem List Items Addressed This Visit             ICD-10-CM    Sprain of ligament of right ankle S93.401D       Subjective   General  Reason for Referral: R ankle injury  Referred By: Acosta LILLY-TONJA  General Comment: POC:7/12  HEP: Yes  Patient states that he wore his boots all day yesterday at work and states that it felt a little different but states that he didn't have pain.     Precautions  Precautions  Precautions Comment: Hx of HTN; can wean out of brace as tolerated    Pain  Pain Assessment: 0-10  Pain Score: 3  Pain Location: Ankle  Pain Orientation: Right    Objective     Treatments:  Therapeutic Exercise: 39 minutes, 3 units  Review of brace use  Recumbent Bicycle LEVEL 1.0 x5 min   Slantboard gastroc stretch 2x1 min   Heel/toe raises in standing 2x10 level surface   Step ups 6 in R LE ascending 2x10  "Fwd/Lateral  R ankle SLS 3x30 seconds on airex  Tandem stance 3 x 30\" ea direction on airex   Ankle traction x5' PF/DF/CW/CCW R (X)   BAPS LEVEL 3.0 DF/PF/L/R/CW/CCW x20 each direction   R ankle PROM INV/EV with blue strap x10 each direction (X, not today)  Resistive R ankle DF/PF/EV/INV 2x10 with BOSU strap each direction (P, resistance)    Small balance board 2x1' (N)  Long sitting R gastroc stretch with strap 3x20 second    Manual;  PROM completed, STW to plantar of foot (X, not today)     OP EDUCATION:   Access Code: [email or web address]  URL: [email or web address]  Date: 05/8/2024  Prepared by: Isabell Johnson     Exercises  - Long Sitting Calf Stretch with Strap  - 1 x daily - 7 x weekly - 1 sets - 3 reps - 20-30 second hold  - Long Sitting Ankle Plantar Flexion with Resistance  - 1 x daily - 7 x weekly - 1-2 sets - 10 reps  - Long Sitting Ankle Eversion with Resistance  - 1 x daily - 7 x weekly - 1-2 sets - 10 reps  - Long Sitting Ankle Inversion with Resistance  - 1 x daily - 7 x weekly - 1-2 sets - 10 reps  - Long Sitting Ankle Dorsiflexion with Anchored Resistance  - 1 x daily - 7 x weekly - 1-2 sets - 10 reps    Goals:  Active       PT Problem       PT Goals       Start:  05/10/24    Expected End:  06/21/24       Increase in LEFS score by 10 points to demonstrate improved functional mobility of B ankles for ease with standing and walking.-Week 6  Patient will demonstrate R SLS on compliant surface x20 seconds min ankle reaction to increase stability with working on uneven surfaces to prevent injury-Week 6  Improved gross R ankle and hip musculature strength 5/5 MMT to increase stability/support with standing and ambulation at home, community, and work.-Week 6  Decrease in max pain of R ankle  0/10 or less to demonstrate improved QOL-Week 6  Improved R ankle AROM DF: 10 deg and EV: 10 deg painfree for improved ease with completing job duties in standing/weightbearing-Week 6  Patient will " demonstrate compliance in their home exercise program in order to promote independence in self management of functional mobility.-Week 2

## 2024-05-31 ENCOUNTER — TREATMENT (OUTPATIENT)
Dept: PHYSICAL THERAPY | Facility: CLINIC | Age: 54
End: 2024-05-31
Payer: COMMERCIAL

## 2024-05-31 DIAGNOSIS — S93.401D SPRAIN OF LIGAMENT OF RIGHT ANKLE, SUBSEQUENT ENCOUNTER: ICD-10-CM

## 2024-05-31 PROCEDURE — 97110 THERAPEUTIC EXERCISES: CPT | Mod: GP,CQ

## 2024-05-31 ASSESSMENT — PAIN - FUNCTIONAL ASSESSMENT: PAIN_FUNCTIONAL_ASSESSMENT: 0-10

## 2024-05-31 ASSESSMENT — PAIN SCALES - GENERAL: PAINLEVEL_OUTOF10: 0 - NO PAIN

## 2024-05-31 NOTE — PROGRESS NOTES
"Physical Therapy Treatment    Patient Name: Vero Bruner  MRN: [id number]  Today's Date: 5/31/2024  Time Calculation  Start Time: 0730  Stop Time: 0811  Time Calculation (min): 41 min  PT Therapeutic Procedures Time Entry  Therapeutic Exercise Time Entry: 39       Assessment:   Patient able to tolerate new PRE's with no c/o increased symptoms. Patient demo's good AROM in R ankle DF/PF. Demo's slight challenges with proprioception.     Plan:  Continue to work on improving strength and decreasing pain to be able to perform normal work duties with little to no challenges. -AB.     OP PT Plan  Treatment/Interventions: Cryotherapy, Education/ Instruction, Gait training, Manual therapy, Neuromuscular re-education, Therapeutic activities, Taping techniques, Therapeutic exercises, Other (comment), Vasopneumatic device (IASTM/cupping)  PT Plan: Skilled PT  PT Frequency:  (1-2x)  Duration: 6 weeks for 12 total visits in POC  Onset Date: 04/08/24  Number of Treatments Authorized: 12  Rehab Potential: Good  Plan of Care Agreement: Patient    Current Problem  Problem List Items Addressed This Visit             ICD-10-CM    Sprain of ligament of right ankle S93.401A       Subjective   General  Reason for Referral: R ankle injury  Referred By: Acosta LILLY-TONJA  General Comment: POC:8/12  HEP: Yes  Patient states that he's not having pain currently.     Precautions  Precautions  Precautions Comment: Hx of HTN; can wean out of brace as tolerated    Pain  Pain Assessment: 0-10  Pain Score: 0 - No pain  Pain Location: Ankle  Pain Orientation: Right    Objective     Treatments:  Therapeutic Exercise: 39 minutes, 3 units  Review of brace use  Recumbent Bicycle LEVEL 1.0 x5 min   Slantboard gastroc stretch 2x1 min   Heel/toe raises in standing 2x10 on airex (P, surface)   Step ups 6 in R LE ascending 2x10 Fwd/Lateral  R ankle SLS 3x30 seconds on airex  Tandem stance 3 x 30\" ea direction on airex   Standing Fwd lunges 2x10 on airex (N)  Ankle " traction x5' PF/DF/CW/CCW R (X)   BAPS LEVEL 3.0 DF/PF/L/R/CW/CCW x20 each direction   R ankle PROM INV/EV with blue strap x10 each direction (X, not today)  Resistive R ankle DF/PF/EV/INV 2x10 with BOSU strap each direction (P, resistance)    Small balance board 2x1'   Long sitting R gastroc stretch with strap 3x20 second (X)    Manual   PROM completed, STW to plantar of foot (X, not today)     OP EDUCATION:   Access Code: [email or web address]  URL: [email or web address]  Date: 05/8/2024  Prepared by: Isabell Johnson     Exercises  - Long Sitting Calf Stretch with Strap  - 1 x daily - 7 x weekly - 1 sets - 3 reps - 20-30 second hold  - Long Sitting Ankle Plantar Flexion with Resistance  - 1 x daily - 7 x weekly - 1-2 sets - 10 reps  - Long Sitting Ankle Eversion with Resistance  - 1 x daily - 7 x weekly - 1-2 sets - 10 reps  - Long Sitting Ankle Inversion with Resistance  - 1 x daily - 7 x weekly - 1-2 sets - 10 reps  - Long Sitting Ankle Dorsiflexion with Anchored Resistance  - 1 x daily - 7 x weekly - 1-2 sets - 10 reps    Goals:  Active       PT Problem       PT Goals       Start:  05/10/24    Expected End:  06/21/24       Increase in LEFS score by 10 points to demonstrate improved functional mobility of B ankles for ease with standing and walking.-Week 6  Patient will demonstrate R SLS on compliant surface x20 seconds min ankle reaction to increase stability with working on uneven surfaces to prevent injury-Week 6  Improved gross R ankle and hip musculature strength 5/5 MMT to increase stability/support with standing and ambulation at home, community, and work.-Week 6  Decrease in max pain of R ankle  0/10 or less to demonstrate improved QOL-Week 6  Improved R ankle AROM DF: 10 deg and EV: 10 deg painfree for improved ease with completing job duties in standing/weightbearing-Week 6  Patient will demonstrate compliance in their home exercise program in order to promote independence in self  management of functional mobility.-Week 2

## 2024-06-03 ENCOUNTER — TREATMENT (OUTPATIENT)
Dept: PHYSICAL THERAPY | Facility: CLINIC | Age: 54
End: 2024-06-03
Payer: COMMERCIAL

## 2024-06-03 DIAGNOSIS — S93.401D SPRAIN OF LIGAMENT OF RIGHT ANKLE, SUBSEQUENT ENCOUNTER: ICD-10-CM

## 2024-06-03 PROCEDURE — 97110 THERAPEUTIC EXERCISES: CPT | Mod: GP,CQ

## 2024-06-03 ASSESSMENT — PAIN - FUNCTIONAL ASSESSMENT: PAIN_FUNCTIONAL_ASSESSMENT: 0-10

## 2024-06-03 ASSESSMENT — PAIN SCALES - GENERAL: PAINLEVEL_OUTOF10: 0 - NO PAIN

## 2024-06-03 NOTE — PROGRESS NOTES
"Physical Therapy Treatment    Patient Name: Vero Bruner  MRN: [id number]  Today's Date: 6/3/2024  Time Calculation  Start Time: 0702  Stop Time: 0742  Time Calculation (min): 40 min      Assessment:   Good tolerance and response to treatment with no c/o pain, only mild fatigue, afterwards. Challenged with balance activities but no LOB -> patient able to self-correct. Good ROM observed in right ankle.    Plan:  Continue to work on improving strength and decreasing pain to be able to perform normal work duties with little to no challenges.     OP PT Plan  Treatment/Interventions: Cryotherapy, Education/ Instruction, Gait training, Manual therapy, Neuromuscular re-education, Therapeutic activities, Taping techniques, Therapeutic exercises, Other (comment), Vasopneumatic device (IASTM/cupping)  PT Plan: Skilled PT  PT Frequency:  (1-2x)  Duration: 6 weeks for 12 total visits in POC  Onset Date: 04/08/24  Number of Treatments Authorized: 12  Rehab Potential: Good  Plan of Care Agreement: Patient    Current Problem  Problem List Items Addressed This Visit             ICD-10-CM    Sprain of ligament of right ankle S93.401A       Subjective   Patient stated his ankle has improved over the past wk or 2. Still get some tightness once in a while but no pain today.     Precautions  Precautions  Precautions Comment: Hx of HTN; can wean out of brace as tolerated    Pain  Pain Assessment: 0-10  Pain Score: 0 - No pain  Pain Location: Ankle  Pain Orientation: Right    Objective:  Therapeutic Exercise: 39 minutes, 3 units  Review of brace use  Recumbent Bicycle LEVEL 1.0 x5 min   Slantboard gastroc stretch 2x1 min   Heel/toe raises in standing 2x10 on airex    Step ups 6 in R LE ascending 2x10 Fwd/Lateral  R ankle SLS 3x30 seconds on airex  Tandem stance 3 x 30\" ea direction on airex   Standing Fwd lunges 2x10 on BOSU  [P]  Ankle traction x5' PF/DF/CW/CCW R (X)   BAPS LEVEL 3.0 DF/PF/L/R/CW/CCW x20 each direction   R ankle PROM INV/EV " with blue strap x10 each direction (X, not today)  Resistive R ankle DF/PF/EV/INV 2x10 with BOSU strap each direction (P, resistance)    Small balance board 2x1'   Long sitting R gastroc stretch with strap 3x20 second (X)     Manual   PROM completed, STW to plantar of foot (X, not today)     OP EDUCATION:   Access Code: [email or web address]  URL: [email or web address]  Date: 05/8/2024  Prepared by: Isabell Johnson     Exercises  - Long Sitting Calf Stretch with Strap  - 1 x daily - 7 x weekly - 1 sets - 3 reps - 20-30 second hold  - Long Sitting Ankle Plantar Flexion with Resistance  - 1 x daily - 7 x weekly - 1-2 sets - 10 reps  - Long Sitting Ankle Eversion with Resistance  - 1 x daily - 7 x weekly - 1-2 sets - 10 reps  - Long Sitting Ankle Inversion with Resistance  - 1 x daily - 7 x weekly - 1-2 sets - 10 reps  - Long Sitting Ankle Dorsiflexion with Anchored Resistance  - 1 x daily - 7 x weekly - 1-2 sets - 10 reps       Goals:  Active       PT Problem       PT Goals       Start:  05/10/24    Expected End:  06/21/24       Increase in LEFS score by 10 points to demonstrate improved functional mobility of B ankles for ease with standing and walking.-Week 6  Patient will demonstrate R SLS on compliant surface x20 seconds min ankle reaction to increase stability with working on uneven surfaces to prevent injury-Week 6  Improved gross R ankle and hip musculature strength 5/5 MMT to increase stability/support with standing and ambulation at home, community, and work.-Week 6  Decrease in max pain of R ankle  0/10 or less to demonstrate improved QOL-Week 6  Improved R ankle AROM DF: 10 deg and EV: 10 deg painfree for improved ease with completing job duties in standing/weightbearing-Week 6  Patient will demonstrate compliance in their home exercise program in order to promote independence in self management of functional mobility.-Week 2

## 2024-06-06 ENCOUNTER — TREATMENT (OUTPATIENT)
Dept: PHYSICAL THERAPY | Facility: CLINIC | Age: 54
End: 2024-06-06
Payer: COMMERCIAL

## 2024-06-06 DIAGNOSIS — S93.401D SPRAIN OF LIGAMENT OF RIGHT ANKLE, SUBSEQUENT ENCOUNTER: ICD-10-CM

## 2024-06-06 PROCEDURE — 97110 THERAPEUTIC EXERCISES: CPT | Mod: GP,CQ

## 2024-06-06 ASSESSMENT — PAIN SCALES - GENERAL: PAINLEVEL_OUTOF10: 0 - NO PAIN

## 2024-06-06 ASSESSMENT — PAIN - FUNCTIONAL ASSESSMENT: PAIN_FUNCTIONAL_ASSESSMENT: 0-10

## 2024-06-06 NOTE — PROGRESS NOTES
"Physical Therapy Treatment    Patient Name: Vero Bruner  MRN: [id number]  Today's Date: 2024  Time Calculation  Start Time: 702  Stop Time: 745  Time Calculation (min): 43 min      Assessment:   Patient identified with name and .   Continued therex for strength and balance/proprioception with good response and tolerance. He denies pain during treatment, only mild fatigue afterwards.     Plan:  Continue to work on improving strength and decreasing pain to be able to perform normal work duties with little to no challenges.   OP PT Plan  Treatment/Interventions: Cryotherapy, Education/ Instruction, Gait training, Manual therapy, Neuromuscular re-education, Therapeutic activities, Taping techniques, Therapeutic exercises, Other (comment), Vasopneumatic device (IASTM/cupping)  PT Plan: Skilled PT  PT Frequency:  (1-2x)  Duration: 6 weeks for 12 total visits in POC  Onset Date: 24  Number of Treatments Authorized: 12  Rehab Potential: Good  Plan of Care Agreement: Patient    Current Problem  Problem List Items Addressed This Visit             ICD-10-CM    Sprain of ligament of right ankle S93.401A       Subjective   Still some weakness in the right ankle once in a while but no pain.     Precautions  Precautions  Precautions Comment: Hx of HTN; can wean out of brace as tolerated    Pain  Pain Assessment: 0-10  Pain Score: 0 - No pain  Pain Location: Ankle  Pain Orientation: Right    Objective:  Therapeutic Exercise: 39 minutes, 3 units  Review of brace use  Recumbent Bicycle LEVEL 1.0 x5 min   Slantboard gastroc stretch 2x1 min   Heel/toe raises in standing 2x10 on airex    Step ups 6 in R LE ascending 2x10 Fwd/Lateral  R ankle SLS 3x30 seconds on airex  Tandem stance 3 x 30\" ea direction on airex   Standing Fwd lunges 2x10 on BOSU  [P]  Ankle traction x5' PF/DF/CW/CCW R (X)   BAPS LEVEL 3.0 DF/PF/L/R/CW/CCW x20 each direction   R ankle PROM INV/EV with blue strap x10 each direction (X, not today)  Resistive R " ankle DF/PF/EV/INV 2x10 with BOSU strap each direction (P, resistance)    Small balance board 2x1'   Long sitting R gastroc stretch with strap 3x20 second (X)     Manual   PROM completed, STW to plantar of foot (X, not today)     OP EDUCATION:    Access Code: [email or web address]  URL: [email or web address]  Date: 05/8/2024  Prepared by: Isabell Johnson     Exercises  - Long Sitting Calf Stretch with Strap  - 1 x daily - 7 x weekly - 1 sets - 3 reps - 20-30 second hold  - Long Sitting Ankle Plantar Flexion with Resistance  - 1 x daily - 7 x weekly - 1-2 sets - 10 reps  - Long Sitting Ankle Eversion with Resistance  - 1 x daily - 7 x weekly - 1-2 sets - 10 reps  - Long Sitting Ankle Inversion with Resistance  - 1 x daily - 7 x weekly - 1-2 sets - 10 reps  - Long Sitting Ankle Dorsiflexion with Anchored Resistance  - 1 x daily - 7 x weekly - 1-2 sets - 10 reps          Goals:  Active       PT Problem       PT Goals       Start:  05/10/24    Expected End:  06/21/24       Increase in LEFS score by 10 points to demonstrate improved functional mobility of B ankles for ease with standing and walking.-Week 6  Patient will demonstrate R SLS on compliant surface x20 seconds min ankle reaction to increase stability with working on uneven surfaces to prevent injury-Week 6  Improved gross R ankle and hip musculature strength 5/5 MMT to increase stability/support with standing and ambulation at home, community, and work.-Week 6  Decrease in max pain of R ankle  0/10 or less to demonstrate improved QOL-Week 6  Improved R ankle AROM DF: 10 deg and EV: 10 deg painfree for improved ease with completing job duties in standing/weightbearing-Week 6  Patient will demonstrate compliance in their home exercise program in order to promote independence in self management of functional mobility.-Week 2

## 2024-06-10 ENCOUNTER — TREATMENT (OUTPATIENT)
Dept: PHYSICAL THERAPY | Facility: CLINIC | Age: 54
End: 2024-06-10
Payer: COMMERCIAL

## 2024-06-10 DIAGNOSIS — S93.401D SPRAIN OF LIGAMENT OF RIGHT ANKLE, SUBSEQUENT ENCOUNTER: ICD-10-CM

## 2024-06-10 PROCEDURE — 97110 THERAPEUTIC EXERCISES: CPT | Mod: GP,CQ

## 2024-06-10 ASSESSMENT — PAIN SCALES - GENERAL: PAINLEVEL_OUTOF10: 0 - NO PAIN

## 2024-06-10 ASSESSMENT — PAIN - FUNCTIONAL ASSESSMENT: PAIN_FUNCTIONAL_ASSESSMENT: 0-10

## 2024-06-10 NOTE — PROGRESS NOTES
"Physical Therapy Treatment    Patient Name: Vero Bruner  MRN: [id number]  Today's Date: 6/10/2024  Time Calculation  Start Time: 703  Stop Time: 745  Time Calculation (min): 42 min      Assessment:   Patient identified with name and .   HEP reviewed = good patient understanding. Better stability observed during closed-chain tasks and patient ambulates without deviation. He'll meet with evaluating PT next session for reassessment.     Plan:  Continue to work on improving strength and decreasing pain to be able to perform normal work duties with little to no challenges.     OP PT Plan  Treatment/Interventions: Cryotherapy, Education/ Instruction, Gait training, Manual therapy, Neuromuscular re-education, Therapeutic activities, Taping techniques, Therapeutic exercises, Other (comment), Vasopneumatic device (IASTM/cupping)  PT Plan: Skilled PT  PT Frequency:  (1-2x)  Duration: 6 weeks for 12 total visits in POC  Onset Date: 24  Number of Treatments Authorized: 12  Rehab Potential: Good  Plan of Care Agreement: Patient    Current Problem  Problem List Items Addressed This Visit             ICD-10-CM    Sprain of ligament of right ankle S93.401A       Subjective   Patient stated he's doing well. Ankle is feeling stronger and no pain.     Precautions  Precautions  Precautions Comment: Hx of HTN; can wean out of brace as tolerated    Pain  Pain Assessment: 0-10  Pain Score: 0 - No pain  Pain Location: Ankle  Pain Orientation: Right    Objective:  Therapeutic Exercise: 39 minutes, 3 units  Review of brace use  Recumbent Bicycle LEVEL 1.0 x6 min   Slantboard gastroc stretch 2x1 min   Heel/toe raises in standing 2x10 on airex    Step ups 6 in R LE ascending 2x10 Fwd/Lateral  R ankle SLS 3x30 seconds on airex  Tandem stance 3 x 30\" ea direction on airex   Standing Fwd lunges 2x10 on BOSU    Ankle traction x5' PF/DF/CW/CCW R (X)   BAPS LEVEL 3.0 DF/PF/L/R/CW/CCW x20 each direction   R ankle PROM INV/EV with blue strap " x10 each direction (X, not today)  Resistive R ankle DF/PF/EV/INV 2x10 with BOSU strap each direction (P, resistance)    Small balance board 2x1'   Long sitting R gastroc stretch with strap 3x20 second (X)     Manual   PROM completed, STW to plantar of foot (X, not today)        OP EDUCATION:     Access Code: [email or web address]  URL: [email or web address]  Date: 05/8/2024  Prepared by: Isabell Johnson     Exercises  - Long Sitting Calf Stretch with Strap  - 1 x daily - 7 x weekly - 1 sets - 3 reps - 20-30 second hold  - Long Sitting Ankle Plantar Flexion with Resistance  - 1 x daily - 7 x weekly - 1-2 sets - 10 reps  - Long Sitting Ankle Eversion with Resistance  - 1 x daily - 7 x weekly - 1-2 sets - 10 reps  - Long Sitting Ankle Inversion with Resistance  - 1 x daily - 7 x weekly - 1-2 sets - 10 reps  - Long Sitting Ankle Dorsiflexion with Anchored Resistance  - 1 x daily - 7 x weekly - 1-2 sets - 10 reps    Goals:  Active       PT Problem       PT Goals       Start:  05/10/24    Expected End:  06/21/24       Increase in LEFS score by 10 points to demonstrate improved functional mobility of B ankles for ease with standing and walking.-Week 6  Patient will demonstrate R SLS on compliant surface x20 seconds min ankle reaction to increase stability with working on uneven surfaces to prevent injury-Week 6  Improved gross R ankle and hip musculature strength 5/5 MMT to increase stability/support with standing and ambulation at home, community, and work.-Week 6  Decrease in max pain of R ankle  0/10 or less to demonstrate improved QOL-Week 6  Improved R ankle AROM DF: 10 deg and EV: 10 deg painfree for improved ease with completing job duties in standing/weightbearing-Week 6  Patient will demonstrate compliance in their home exercise program in order to promote independence in self management of functional mobility.-Week 2

## 2024-06-14 ENCOUNTER — APPOINTMENT (OUTPATIENT)
Dept: PHYSICAL THERAPY | Facility: CLINIC | Age: 54
End: 2024-06-14
Payer: COMMERCIAL

## 2024-06-17 ENCOUNTER — DOCUMENTATION (OUTPATIENT)
Dept: PHYSICAL THERAPY | Facility: CLINIC | Age: 54
End: 2024-06-17
Payer: COMMERCIAL

## 2024-06-17 NOTE — PROGRESS NOTES
Physical Therapy                 Therapy Communication Note    Patient Name: Vero Bruner  MRN: [id number]  Today's Date: 6/17/2024     Discipline: Physical Therapy    Missed Time: No Show    Comment: Patient NS/NC treatment session.

## 2024-07-01 ENCOUNTER — APPOINTMENT (OUTPATIENT)
Dept: ORTHOPEDIC SURGERY | Facility: CLINIC | Age: 54
End: 2024-07-01
Payer: COMMERCIAL

## 2024-07-01 DIAGNOSIS — S93.401A MODERATE RIGHT ANKLE SPRAIN, INITIAL ENCOUNTER: Primary | ICD-10-CM

## 2024-07-01 PROCEDURE — 99213 OFFICE O/P EST LOW 20 MIN: CPT | Performed by: NURSE PRACTITIONER

## 2024-07-01 ASSESSMENT — ENCOUNTER SYMPTOMS
ARTHRALGIAS: 0
ENDOCRINE NEGATIVE: 1
NEUROLOGICAL NEGATIVE: 1
CARDIOVASCULAR NEGATIVE: 1
PSYCHIATRIC NEGATIVE: 1
RESPIRATORY NEGATIVE: 1
HEMATOLOGIC/LYMPHATIC NEGATIVE: 1
CONSTITUTIONAL NEGATIVE: 1

## 2024-07-01 ASSESSMENT — PAIN - FUNCTIONAL ASSESSMENT: PAIN_FUNCTIONAL_ASSESSMENT: NO/DENIES PAIN

## 2024-07-01 NOTE — ASSESSMENT & PLAN NOTE
Continue with home exercises as instructed, activity as tolerated  Discussed being aware of aggravation of symptoms to allow for rest, brace as tolerated  [employer] updated, continue full duty work, no restrictions, follow-up as needed for any concerns or symptom aggravation  Patient in agreement with plan of care   This note was generated using Dragon software.  It may contain errors in wording, punctuation or spelling.

## 2024-07-01 NOTE — PROGRESS NOTES
Subjective    Patient ID: Vero Bruner is a 53 y.o. male.    Chief Complaint: Follow-up and Worker's Compensation of the Right Ankle (Patient states he does not need to wear brace anymore.  He has completed  physical therapy.)    [facility] DOI: 4/8/24    Right Ankle       Vero is a pleasant 53-year-old gentleman presenting today for FUV of right ankle sprain from 4/8/24.  Patient was walking sideways on a job site, rolled his ankle inward on an incline, he was able to brace himself to prevent falling with a rake.  No prior injuries.    Recently completed PT and feels total resolution of symptoms.  No aggravating factors identified.  Patient has weaned out of of stabilizing brace and is tolerating full activity with no difficulties.  Continues with home exercises several times daily.  Has been tolerating full duty work well with no  No new injuries    Review of Systems   Constitutional: Negative.    HENT: Negative.     Respiratory: Negative.     Cardiovascular: Negative.    Endocrine: Negative.    Musculoskeletal:  Negative for arthralgias.   Skin: Negative.    Neurological: Negative.    Hematological: Negative.    Psychiatric/Behavioral: Negative.         Objective   Right Ankle Exam   Right ankle exam is normal.    Tenderness   Right ankle tenderness location: Resolved.  Right ankle swelling: Improved.    Range of Motion   Dorsiflexion:  20   Plantar flexion:  50   Eversion:  30   Inversion:  50     Tests   Anterior drawer: negative  Varus tilt: negative    Other   Erythema: absent  Sensation: normal  Pulse: present     Comments:  No instability of the ankle joint on exam.  Full ROM of distal joints with no sx aggravation  with distal motor or sensory intact, cap refill at 2 to 3 seconds.  No pain with metatarsal squeeze test.            Image Results:  XR ankle right 3+ views  Narrative: Interpreted By:  Jason Brady,   STUDY:  XR ANKLE RIGHT 3+ VIEWS; ;  5/15/2024 8:34 am      INDICATION:  Signs/Symptoms:S/P INJURY,  PAIN.      COMPARISON:  04/24/2024      ACCESSION NUMBER(S):  [id number]      ORDERING CLINICIAN:  ARIANNE CROWLEY      FINDINGS:  Three views of the right ankle.      No acute fracture. No dislocation. Mild medial malleolar spurring.  The soft tissues are unremarkable.      Impression: No acute fracture or dislocation.      MACRO:  None      Signed by: Jason Brady 5/17/2024 5:50 PM  Dictation workstation:   [id number]      Reviewed most recent PT note on date of visit, patient advancing well    Assessment/Plan   Encounter Diagnoses:    Problem List Items Addressed This Visit             ICD-10-CM    Moderate right ankle sprain - Primary S93.401A     Continue with home exercises as instructed, activity as tolerated  Discussed being aware of aggravation of symptoms to allow for rest, brace as tolerated  [employer] updated, continue full duty work, no restrictions, follow-up as needed for any concerns or symptom aggravation  Patient in agreement with plan of care   This note was generated using Dragon software.  It may contain errors in wording, punctuation or spelling.

## 2024-08-01 ENCOUNTER — DOCUMENTATION (OUTPATIENT)
Dept: PHYSICAL THERAPY | Facility: CLINIC | Age: 54
End: 2024-08-01
Payer: COMMERCIAL

## 2024-08-01 NOTE — PROGRESS NOTES
Physical Therapy                 Therapy Communication Note    Patient Name: Vero Bruner  MRN: [id number]  Today's Date: 8/1/2024     Discipline: Physical Therapy    Patient to be D/C from skilled physical therapy at this time d/t patient not making/maintaining scheduled appointments. Patient last seen >=30 days ago. Please refer to previous notes to see functional baseline. Further functional measures not obtained d/t limited visits.

## 2024-10-21 ENCOUNTER — HOSPITAL ENCOUNTER (EMERGENCY)
Facility: HOSPITAL | Age: 54
Discharge: HOME | End: 2024-10-21
Attending: EMERGENCY MEDICINE
Payer: COMMERCIAL

## 2024-10-21 ENCOUNTER — APPOINTMENT (OUTPATIENT)
Dept: RADIOLOGY | Facility: HOSPITAL | Age: 54
End: 2024-10-21
Payer: COMMERCIAL

## 2024-10-21 VITALS
OXYGEN SATURATION: 96 % | DIASTOLIC BLOOD PRESSURE: 93 MMHG | RESPIRATION RATE: 18 BRPM | TEMPERATURE: 98.4 F | WEIGHT: 218 LBS | BODY MASS INDEX: 30.52 KG/M2 | HEIGHT: 71 IN | HEART RATE: 74 BPM | SYSTOLIC BLOOD PRESSURE: 131 MMHG

## 2024-10-21 DIAGNOSIS — S60.222A HEMATOMA OF LEFT HAND: ICD-10-CM

## 2024-10-21 DIAGNOSIS — S67.22XA CRUSH INJURY TO HAND, LEFT, INITIAL ENCOUNTER: Primary | ICD-10-CM

## 2024-10-21 PROCEDURE — 73130 X-RAY EXAM OF HAND: CPT | Mod: LEFT SIDE | Performed by: RADIOLOGY

## 2024-10-21 PROCEDURE — 99284 EMERGENCY DEPT VISIT MOD MDM: CPT | Mod: 25

## 2024-10-21 PROCEDURE — 73110 X-RAY EXAM OF WRIST: CPT | Mod: LT

## 2024-10-21 PROCEDURE — 2500000001 HC RX 250 WO HCPCS SELF ADMINISTERED DRUGS (ALT 637 FOR MEDICARE OP): Performed by: EMERGENCY MEDICINE

## 2024-10-21 PROCEDURE — 73130 X-RAY EXAM OF HAND: CPT | Mod: LT

## 2024-10-21 PROCEDURE — 73110 X-RAY EXAM OF WRIST: CPT | Mod: LEFT SIDE | Performed by: RADIOLOGY

## 2024-10-21 RX ORDER — HYDROCODONE BITARTRATE AND ACETAMINOPHEN 5; 325 MG/1; MG/1
1 TABLET ORAL ONCE
Status: COMPLETED | OUTPATIENT
Start: 2024-10-21 | End: 2024-10-21

## 2024-10-21 RX ORDER — TRAMADOL HYDROCHLORIDE 50 MG/1
50 TABLET ORAL EVERY 6 HOURS PRN
Qty: 12 TABLET | Refills: 0 | Status: SHIPPED | OUTPATIENT
Start: 2024-10-21 | End: 2024-10-24

## 2024-10-21 RX ADMIN — HYDROCODONE BITARTRATE AND ACETAMINOPHEN 1 TABLET: 5; 325 TABLET ORAL at 09:11

## 2024-10-21 ASSESSMENT — PAIN - FUNCTIONAL ASSESSMENT: PAIN_FUNCTIONAL_ASSESSMENT: 0-10

## 2024-10-21 ASSESSMENT — PAIN SCALES - GENERAL: PAINLEVEL_OUTOF10: 7

## 2024-10-21 ASSESSMENT — PAIN DESCRIPTION - LOCATION: LOCATION: HAND

## 2024-10-21 ASSESSMENT — COLUMBIA-SUICIDE SEVERITY RATING SCALE - C-SSRS
1. IN THE PAST MONTH, HAVE YOU WISHED YOU WERE DEAD OR WISHED YOU COULD GO TO SLEEP AND NOT WAKE UP?: NO
6. HAVE YOU EVER DONE ANYTHING, STARTED TO DO ANYTHING, OR PREPARED TO DO ANYTHING TO END YOUR LIFE?: NO
2. HAVE YOU ACTUALLY HAD ANY THOUGHTS OF KILLING YOURSELF?: NO

## 2024-10-21 ASSESSMENT — VISUAL ACUITY: OU: 1

## 2024-10-21 ASSESSMENT — PAIN DESCRIPTION - ORIENTATION: ORIENTATION: LEFT

## 2024-10-21 ASSESSMENT — PAIN DESCRIPTION - PAIN TYPE: TYPE: ACUTE PAIN

## 2024-10-21 NOTE — ED PROVIDER NOTES
Crush injury to left hand and wrist.  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  He noted he did swelling of the dorsal aspect of his left hand and also notes some pain of his left wrist he is not sure of the pain is from the injury or just from the amount of swelling over the dorsal aspect of his left hand he is right-hand dominant.  He denies any other injuries.      History provided by:  Patient   used: No         Physical Exam  Vitals and nursing note reviewed.   Constitutional:       General: He is awake.      Appearance: Normal appearance. He is overweight.   HENT:      Head: Normocephalic and atraumatic.      Right Ear: Hearing and external ear normal.      Left Ear: Hearing and external ear normal.      Nose: Nose normal. No congestion or rhinorrhea.      Mouth/Throat:      Mouth: Mucous membranes are moist.      Pharynx: Oropharynx is clear. Uvula midline. No oropharyngeal exudate or posterior oropharyngeal erythema.   Eyes:      General: Lids are normal. Vision grossly intact.         Right eye: No discharge.         Left eye: No discharge.      Extraocular Movements: Extraocular movements intact.      Conjunctiva/sclera: Conjunctivae normal.      Pupils: Pupils are equal, round, and reactive to light.   Cardiovascular:      Rate and Rhythm: Normal rate and regular rhythm.      Pulses: Normal pulses.      Heart sounds: Normal heart sounds. No murmur heard.     No friction rub. No gallop.   Pulmonary:      Effort: Pulmonary effort is normal. No respiratory distress.      Breath sounds:  Normal breath sounds. No stridor. No wheezing, rhonchi or rales.   Chest:      Chest wall: No tenderness.   Abdominal:      General: Abdomen is flat. Bowel sounds are normal. There is no distension.      Palpations: Abdomen is soft. There is no mass.      Tenderness: There is no abdominal tenderness. There is no guarding or rebound.      Hernia: No hernia is present.   Musculoskeletal:         General: No swelling, deformity or signs of injury. Normal range of motion.      Right hand: Swelling and tenderness present.        Hands:       Cervical back: Full passive range of motion without pain, normal range of motion and neck supple.      Right lower leg: No edema.      Left lower leg: No edema.      Comments: Evaluation of the left upper extremity is a large hematoma over the dorsal aspect of the left hand there is also a evidence of previous surgery with resection of the entire index finger.  Pulses are +2/4 and present at the wrist.  Patient does have some tenderness over the dorsal aspect of the left wrist with no obvious deformity.  Patient has diffuse tenderness over the dorsal aspect of the left hand primarily over the third metacarpal.   Skin:     General: Skin is warm and dry.      Capillary Refill: Capillary refill takes less than 2 seconds.      Coloration: Skin is not jaundiced or pale.      Findings: No bruising, erythema, lesion or rash.   Neurological:      General: No focal deficit present.      Mental Status: He is alert and oriented to person, place, and time.      GCS: GCS eye subscore is 4. GCS verbal subscore is 5. GCS motor subscore is 6.      Cranial Nerves: Cranial nerves 2-12 are intact. No cranial nerve deficit.      Sensory: Sensation is intact. No sensory deficit.      Motor: Motor function is intact. No weakness.      Coordination: Coordination is intact. Coordination normal.      Deep Tendon Reflexes: Reflexes normal.   Psychiatric:         Attention and Perception: Attention and  perception normal.         Mood and Affect: Mood and affect normal.         Speech: Speech normal.         Behavior: Behavior normal. Behavior is cooperative.         Thought Content: Thought content normal.         Cognition and Memory: Cognition and memory normal.         Judgment: Judgment normal.          Labs Reviewed - No data to display     XR hand left 3+ views   Final Result   Moderate soft tissue swelling over the dorsum of the hand and wrist   without underlying fracture or subluxation.        Signed by: Hollis Murphy 10/21/2024 8:43 AM   Dictation workstation:   [id number]      XR wrist left 3+ views   Final Result   Moderate soft tissue swelling over the dorsum of the hand and wrist   without underlying fracture or subluxation.        Signed by: Hollis Murphy 10/21/2024 8:43 AM   Dictation workstation:   [id number]           Procedures     Medical Decision Making  Patient was seen and evaluated in the ED due to crush injury to his left hand that occurred just prior to arrival.  Patient clinically is noted to have a large hematoma and tenderness over the dorsal aspect of the left hand and also some tenderness of the left wrist.  X-rays of the left hand and wrist were ordered.  X-rays of the left hand and wrist revealed no acute injury there is evidence of previous amputation of most of the patient's index finger.  Patient was treated with ice during the course of the ED visit and treated with a Ace wrap.  Patient was also treated with oral Norco while in the ED.  Initially I told him I was not going to be treating with narcotics since he did not have a fracture but nursing staff stated that he was upset and he was prescribed Norco.  He states he cannot take Tylenol because of some liver abnormality.  He was referred to Workmen's Comp. for follow-up he was provided work restrictions and told that he can go back to work today and that he would have limited use of his left hand until the hematoma  resolved and/or Workmen's Comp. released him to work.         Diagnoses as of 10/21/24 1512   Crush injury to hand, left, initial encounter   Hematoma of left hand                    Justus Oliva, DO  10/21/24 1512

## 2024-10-24 ENCOUNTER — OFFICE VISIT (OUTPATIENT)
Dept: URGENT CARE | Facility: CLINIC | Age: 54
End: 2024-10-24
Payer: COMMERCIAL

## 2024-10-24 VITALS
HEART RATE: 67 BPM | BODY MASS INDEX: 30.52 KG/M2 | TEMPERATURE: 98 F | OXYGEN SATURATION: 97 % | DIASTOLIC BLOOD PRESSURE: 94 MMHG | SYSTOLIC BLOOD PRESSURE: 148 MMHG | HEIGHT: 71 IN | WEIGHT: 218 LBS

## 2024-10-24 DIAGNOSIS — S67.22XD CRUSHING INJURY OF LEFT HAND, SUBSEQUENT ENCOUNTER: Primary | ICD-10-CM

## 2024-10-24 PROCEDURE — 99213 OFFICE O/P EST LOW 20 MIN: CPT | Performed by: PHYSICIAN ASSISTANT

## 2024-10-24 NOTE — PROGRESS NOTES
"Subjective   Patient ID: Vero Bruner is a 54 y.o. male who presents for Worker's Compensation ([facility] crush injury to left hand X 3 days ago ).  HPI  Patient presents for evaluation of left hand injury.  Patient suffered crush injury to the hand 3 days ago.  Patient was evaluated in the ER and x-rays were negative at the time.  This was a Worker's Compensation case and the patient did follow-up with occupational health.  Patient is scheduled to have another x-ray in 3 days to determine whether or not an MRI is necessary.  Patient's reports some new swelling in the forearm and is concerned about a possible DVT.  Patient contacted occupational health who instructed the patient to come here.  No other complaints.    Review of Systems    Constitutional:  See HPI    Musculoskeletal: See HPI  Neurologic:  Alert and oriented X4, No numbness, No tingling.    All other systems are negative     Objective     BP (!) 148/94   Pulse 67   Temp 36.7 °C (98 °F)   Ht 1.803 m (5' 11\")   Wt 98.9 kg (218 lb)   SpO2 97%   BMI 30.40 kg/m²     Physical Exam    General:  Alert and oriented, No acute distress.    Eye:  Pupils are equal, round and reactive to light, Normal conjunctiva.    HENT:  Normocephalic,   Neck:  Supple    Respiratory: Respirations are non-labored   Musculoskeletal: Moderate swelling over the dorsal left hand extending into the forearm; patient is status post amputation of the index finger around this hand; there is no heat or warmth nor is there induration in the area of concern; there is some random swelling throughout the forearm  Integumentary:  Warm, Dry, Intact, No pallor, No rash.    Neurologic:  Alert, Oriented, Normal sensory, Cranial Nerves II-XII are grossly intact  Psychiatric:  Cooperative, Appropriate mood & affect.    Assessment/Plan   Exam is not consistent with DVT though the patient was advised that this cannot be ruled out without an ultrasound which is not available here.  Patient was counseled " that this is not a likely location for DVT, either.  Patient advised that if this is his primary concern, go to ER for ultrasound.  Patient's clinical presentation is otherwise unremarkable at this time. Patient is discharged with instructions to follow-up with primary care or seek emergency medical attention for worsening symptoms or any new concerns.  Problem List Items Addressed This Visit    None      Final diagnoses:   None

## 2024-10-28 ENCOUNTER — HOSPITAL ENCOUNTER (OUTPATIENT)
Dept: RADIOLOGY | Facility: CLINIC | Age: 54
Discharge: HOME | End: 2024-10-28
Payer: COMMERCIAL

## 2024-10-28 DIAGNOSIS — S60.222A CONTUSION OF LEFT HAND, INITIAL ENCOUNTER: ICD-10-CM

## 2024-10-28 DIAGNOSIS — S67.22XA CRUSHING INJURY OF LEFT HAND, INITIAL ENCOUNTER: ICD-10-CM

## 2024-10-28 PROCEDURE — 73130 X-RAY EXAM OF HAND: CPT | Mod: LT

## 2024-10-29 ENCOUNTER — HOSPITAL ENCOUNTER (OUTPATIENT)
Dept: RADIOLOGY | Facility: CLINIC | Age: 54
Discharge: HOME | End: 2024-10-29
Payer: COMMERCIAL

## 2024-10-29 DIAGNOSIS — S67.22XA CRUSHING INJURY OF LEFT HAND, INITIAL ENCOUNTER: ICD-10-CM

## 2024-10-29 DIAGNOSIS — S60.222A CONTUSION OF LEFT HAND, INITIAL ENCOUNTER: ICD-10-CM

## 2024-10-29 PROCEDURE — 73090 X-RAY EXAM OF FOREARM: CPT | Mod: LT

## 2024-10-29 PROCEDURE — 73070 X-RAY EXAM OF ELBOW: CPT | Mod: LT

## 2024-11-05 ENCOUNTER — HOSPITAL ENCOUNTER (OUTPATIENT)
Dept: RADIOLOGY | Facility: HOSPITAL | Age: 54
Discharge: HOME | End: 2024-11-05
Payer: COMMERCIAL

## 2024-11-05 DIAGNOSIS — S60.222A CONTUSION OF LEFT HAND, INITIAL ENCOUNTER: ICD-10-CM

## 2024-11-05 DIAGNOSIS — S67.22XA CRUSHING INJURY OF LEFT HAND, INITIAL ENCOUNTER: ICD-10-CM

## 2024-11-05 PROCEDURE — 73221 MRI JOINT UPR EXTREM W/O DYE: CPT | Mod: LT

## 2024-11-05 PROCEDURE — 73218 MRI UPPER EXTREMITY W/O DYE: CPT | Mod: LT

## 2024-11-05 PROCEDURE — 73221 MRI JOINT UPR EXTREM W/O DYE: CPT | Mod: LEFT SIDE | Performed by: STUDENT IN AN ORGANIZED HEALTH CARE EDUCATION/TRAINING PROGRAM

## 2024-11-05 PROCEDURE — 73218 MRI UPPER EXTREMITY W/O DYE: CPT | Mod: LEFT SIDE | Performed by: STUDENT IN AN ORGANIZED HEALTH CARE EDUCATION/TRAINING PROGRAM
